# Patient Record
Sex: MALE | Race: WHITE | NOT HISPANIC OR LATINO | Employment: FULL TIME | URBAN - METROPOLITAN AREA
[De-identification: names, ages, dates, MRNs, and addresses within clinical notes are randomized per-mention and may not be internally consistent; named-entity substitution may affect disease eponyms.]

---

## 2017-07-27 ENCOUNTER — TRANSFERRED RECORDS (OUTPATIENT)
Dept: FAMILY MEDICINE | Facility: CLINIC | Age: 62
End: 2017-07-27

## 2017-09-22 DIAGNOSIS — J30.1 ACUTE SEASONAL ALLERGIC RHINITIS DUE TO POLLEN: ICD-10-CM

## 2017-09-22 RX ORDER — MONTELUKAST SODIUM 10 MG/1
1 TABLET ORAL DAILY
Qty: 30 TABLET | Refills: 0 | COMMUNITY
Start: 2017-09-22 | End: 2017-11-08

## 2017-09-22 RX ORDER — MONTELUKAST SODIUM 10 MG/1
TABLET ORAL
Qty: 90 TABLET | Refills: 0 | OUTPATIENT
Start: 2017-09-22

## 2017-09-22 NOTE — TELEPHONE ENCOUNTER
Called in 1 month of Singulair to [facility]. Pt needs OV for yearly medication review.    Thanks,Fadia  [phone] (home)

## 2017-10-01 DIAGNOSIS — J30.1 ACUTE SEASONAL ALLERGIC RHINITIS DUE TO POLLEN: ICD-10-CM

## 2017-10-01 DIAGNOSIS — J30.1 ALLERGIC RHINITIS DUE TO POLLEN: ICD-10-CM

## 2017-10-02 RX ORDER — OLOPATADINE HYDROCHLORIDE 1 MG/ML
1 SOLUTION/ DROPS OPHTHALMIC 2 TIMES DAILY
Qty: 5 ML | Refills: 0 | COMMUNITY
Start: 2017-10-02 | End: 2017-11-08

## 2017-10-02 RX ORDER — OLOPATADINE HYDROCHLORIDE 1 MG/ML
SOLUTION/ DROPS OPHTHALMIC
Qty: 5 ML | Refills: 0 | OUTPATIENT
Start: 2017-10-02

## 2017-11-08 ENCOUNTER — OFFICE VISIT (OUTPATIENT)
Dept: FAMILY MEDICINE | Facility: CLINIC | Age: 62
End: 2017-11-08

## 2017-11-08 VITALS
BODY MASS INDEX: 24.96 KG/M2 | HEIGHT: 67 IN | WEIGHT: 159 LBS | OXYGEN SATURATION: 98 % | TEMPERATURE: 98.2 F | SYSTOLIC BLOOD PRESSURE: 108 MMHG | DIASTOLIC BLOOD PRESSURE: 72 MMHG | HEART RATE: 82 BPM

## 2017-11-08 DIAGNOSIS — M79.642 PAIN OF LEFT HAND: ICD-10-CM

## 2017-11-08 DIAGNOSIS — Z11.59 NEED FOR HEPATITIS C SCREENING TEST: ICD-10-CM

## 2017-11-08 DIAGNOSIS — M77.41 METATARSALGIA OF BOTH FEET: ICD-10-CM

## 2017-11-08 DIAGNOSIS — N52.9 ERECTILE DYSFUNCTION, UNSPECIFIED ERECTILE DYSFUNCTION TYPE: ICD-10-CM

## 2017-11-08 DIAGNOSIS — J30.1 ACUTE SEASONAL ALLERGIC RHINITIS DUE TO POLLEN: Primary | ICD-10-CM

## 2017-11-08 DIAGNOSIS — M77.42 METATARSALGIA OF BOTH FEET: ICD-10-CM

## 2017-11-08 DIAGNOSIS — Z23 NEED FOR VACCINATION: ICD-10-CM

## 2017-11-08 DIAGNOSIS — Z80.42 FAMILY HISTORY OF MALIGNANT NEOPLASM OF PROSTATE: ICD-10-CM

## 2017-11-08 PROCEDURE — 84153 ASSAY OF PSA TOTAL: CPT | Mod: 90 | Performed by: FAMILY MEDICINE

## 2017-11-08 PROCEDURE — 36415 COLL VENOUS BLD VENIPUNCTURE: CPT | Performed by: FAMILY MEDICINE

## 2017-11-08 PROCEDURE — 99214 OFFICE O/P EST MOD 30 MIN: CPT | Mod: 25 | Performed by: FAMILY MEDICINE

## 2017-11-08 PROCEDURE — 90686 IIV4 VACC NO PRSV 0.5 ML IM: CPT | Performed by: FAMILY MEDICINE

## 2017-11-08 PROCEDURE — 90471 IMMUNIZATION ADMIN: CPT | Performed by: FAMILY MEDICINE

## 2017-11-08 PROCEDURE — 86803 HEPATITIS C AB TEST: CPT | Mod: 90 | Performed by: FAMILY MEDICINE

## 2017-11-08 PROCEDURE — 80048 BASIC METABOLIC PNL TOTAL CA: CPT | Mod: 90 | Performed by: FAMILY MEDICINE

## 2017-11-08 RX ORDER — SILDENAFIL 100 MG/1
100 TABLET, FILM COATED ORAL DAILY PRN
Qty: 6 TABLET | Refills: 1 | COMMUNITY
Start: 2017-11-08 | End: 2018-02-05

## 2017-11-08 RX ORDER — OLOPATADINE HYDROCHLORIDE 1 MG/ML
1 SOLUTION/ DROPS OPHTHALMIC 2 TIMES DAILY
Qty: 15 ML | Refills: 3 | Status: SHIPPED | OUTPATIENT
Start: 2017-11-08 | End: 2018-09-26

## 2017-11-08 RX ORDER — MONTELUKAST SODIUM 10 MG/1
1 TABLET ORAL DAILY
Qty: 90 TABLET | Refills: 3 | Status: SHIPPED | OUTPATIENT
Start: 2017-11-08 | End: 2018-09-26

## 2017-11-08 RX ORDER — CELECOXIB 200 MG/1
200 CAPSULE ORAL DAILY
Qty: 90 CAPSULE | Refills: 3 | Status: SHIPPED | OUTPATIENT
Start: 2017-11-08 | End: 2018-09-26

## 2017-11-08 NOTE — MR AVS SNAPSHOT
After Visit Summary   11/8/2017    Jose F Cortez    MRN: [id number]           Patient Information     Date Of Birth          1955        Visit Information        Provider Department      11/8/2017 3:15 PM Diego Niño MD [facility]        Today's Diagnoses     Acute seasonal allergic rhinitis due to pollen    -  1    Metatarsalgia of both feet        Need for vaccination        Need for hepatitis C screening test        Family history of malignant neoplasm of prostate        Erectile dysfunction, unspecified erectile dysfunction type        Pain of left hand           Follow-ups after your visit        Additional Services     ORTHOPEDICS ADULT REFERRAL       Your provider has referred you to: [facility] [place] [phone]   [email or web address]    Please be aware that coverage of these services is subject to the terms and limitations of your health insurance plan.  Call member services at your health plan with any benefit or coverage questions.      Please bring the following to your appointment:    >>   Any x-rays, CTs or MRIs which have been performed.  Contact the facility where they were done to arrange for  prior to your scheduled appointment.    >>   List of current medications   >>   This referral request   >>   Any documents/labs given to you for this referral                  Follow-up notes from your care team     Return in about 1 year (around 11/8/2018).      Who to contact     If you have questions or need follow up information about today's clinic visit or your schedule please contact [facility] directly at [phone].  Normal or non-critical lab and imaging results will be communicated to you by MyChart, letter or phone within 4 business days after the clinic has received the results. If you do not hear from us within 7 days, please contact the clinic through [employer]  "or phone. If you have a critical or abnormal lab result, we will notify you by phone as soon as possible.  Submit refill requests through [employer] or call your pharmacy and they will forward the refill request to us. Please allow 3 business days for your refill to be completed.          Additional Information About Your Visit        [facility] Information     [employer] gives you secure access to your electronic health record. If you see a primary care provider, you can also send messages to your care team and make appointments. If you have questions, please call your primary care clinic.  If you do not have a primary care provider, please call [phone] and they will assist you.        Care EveryWhere ID     This is your Care EveryWhere ID. This could be used by other organizations to access your [facility] medical records  [id number]        Your Vitals Were     Pulse Temperature Height Pulse Oximetry BMI (Body Mass Index)       82 98.2  F (36.8  C) (Oral) 1.708 m (5' 7.25\") 98% 24.72 kg/m2        Blood Pressure from Last 3 Encounters:   11/08/17 108/72   08/23/16 108/68   11/30/15 120/80    Weight from Last 3 Encounters:   11/08/17 72.1 kg (159 lb)   08/23/16 69.7 kg (153 lb 9.6 oz)   11/30/15 71.7 kg (158 lb)              We Performed the Following     BASIC METABOLIC PANEL ([facility])     HC FLU VAC PRESRV FREE QUAD SPLIT VIR 3+YRS IM     HCL PSA, SCREENING ([facility])     Hepatits C antibody ([facility])     ORTHOPEDICS ADULT REFERRAL     VACCINE ADMINISTRATION, INITIAL     VENOUS COLLECTION          Today's Medication Changes          These changes are accurate as of: 11/8/17  4:12 PM.  If you have any questions, ask your nurse or doctor.               These medicines have changed or have updated prescriptions.        Dose/Directions    * sildenafil 100 MG tablet   Commonly known as:  VIAGRA   This may have changed:  Another medication with the same name was added. Make sure you understand how and when to take each.   Used " for:  Erectile dysfunction, unspecified erectile dysfunction type   Changed by:  Diego Niño MD        Dose:   mg   Take 0.5-1 tablets ( mg) by mouth daily as needed for erectile dysfunction Take 30 min to 4 hours before intercourse.  Never use with nitroglycerin, terazosin or doxazosin.   Quantity:  4 tablet   Refills:  0       * sildenafil 100 MG tablet   Commonly known as:  VIAGRA   This may have changed:  You were already taking a medication with the same name, and this prescription was added. Make sure you understand how and when to take each.   Used for:  Erectile dysfunction, unspecified erectile dysfunction type   Changed by:  Diego Niño MD        Dose:  100 mg   Take 1 tablet (100 mg) by mouth daily as needed 30 min to 4 hrs before sex. Do not use with nitroglycerin, terazosin or doxazosin.   Quantity:  6 tablet   Refills:  1       * Notice:  This list has 2 medication(s) that are the same as other medications prescribed for you. Read the directions carefully, and ask your doctor or other care provider to review them with you.         Where to get your medicines      These medications were sent to [facility] [address] [place] [id number] [facility] AT [place] [address] & [address] [address]     Phone:  [phone]     celecoxib 200 MG capsule    montelukast 10 MG tablet    olopatadine 0.1 % ophthalmic solution         Some of these will need a paper prescription and others can be bought over the counter.  Ask your nurse if you have questions.     You don't need a prescription for these medications     sildenafil 100 MG tablet                Primary Care Provider Office Phone # Fax #    Diego Niño [phone] [address] [address]        Equal Access to Services     CANDIDA FRANKLIN AH: clare Akhtar qaybta kaalmada adeegyada,  caitlyn ramirez kang sanchez'aan ah. So [facility] [phone].    ATENCIÓN: Si isidoro toure, tiene a andrea disposición servicios gratuitos de asistencia lingüística. Dee ghosh [phone].    We comply with applicable federal civil rights laws and [place] laws. We do not discriminate on the basis of race, color, national origin, age, disability, sex, sexual orientation, or gender identity.            Thank you!     Thank you for choosing [facility]  for your care. Our goal is always to provide you with excellent care. Hearing back from our patients is one way we can continue to improve our services. Please take a few minutes to complete the written survey that you may receive in the mail after your visit with us. Thank you!             Your Updated Medication List - Protect others around you: Learn how to safely use, store and throw away your medicines at www.disposemymeds.org.          This list is accurate as of: 11/8/17  4:12 PM.  Always use your most recent med list.                   Brand Name Dispense Instructions for use Diagnosis    aspirin EC 81 MG EC tablet      Take 81 mg by mouth daily.        celecoxib 200 MG capsule    celeBREX    90 capsule    Take 1 capsule (200 mg) by mouth daily    Metatarsalgia of both feet       CLARITIN 10 MG tablet   Generic drug:  loratadine      Take 10 mg by mouth daily.    Allergic rhinitis, cause unspecified       mometasone 0.1 % cream    ELOCON    45 g    Apply topically 2 times daily as needed Apply sparingly to affected area.  Do not apply to face.    Dermatitis       montelukast 10 MG tablet    SINGULAIR    90 tablet    Take 1 tablet (10 mg) by mouth daily    Acute seasonal allergic rhinitis due to pollen       olopatadine 0.1 % ophthalmic solution    PATANOL    15 mL    Place 1 drop into both eyes 2 times daily    Acute seasonal allergic rhinitis due to pollen       * sildenafil 100 MG tablet    VIAGRA    4 tablet    Take 0.5-1 tablets  ( mg) by mouth daily as needed for erectile dysfunction Take 30 min to 4 hours before intercourse.  Never use with nitroglycerin, terazosin or doxazosin.    Erectile dysfunction, unspecified erectile dysfunction type       * sildenafil 100 MG tablet    VIAGRA    6 tablet    Take 1 tablet (100 mg) by mouth daily as needed 30 min to 4 hrs before sex. Do not use with nitroglycerin, terazosin or doxazosin.    Erectile dysfunction, unspecified erectile dysfunction type       * Notice:  This list has 2 medication(s) that are the same as other medications prescribed for you. Read the directions carefully, and ask your doctor or other care provider to review them with you.

## 2017-11-08 NOTE — NURSING NOTE
Jose F TING MichelleDiego is here today for a non fasting medication recheck.    Pre-Visit Screening :  Immunizations : up to date, Flu Shot Today  Colon Screening : is up to date  Asthma Action Test/Plan : CALLY  PHQ9/GAD7 :  NA    Pulse - regular  My Chart - accepts    CLASSIFICATION OF OVERWEIGHT AND OBESITY BY BMI                         Obesity Class           BMI(kg/m2)  Underweight                                    < 18.5  Normal                                         18.5-24.9  Overweight                                     25.0-29.9  OBESITY                     I                  30.0-34.9                              II                 35.0-39.9  EXTREME OBESITY             III                >40                             Patient's  BMI Body mass index is 24.72 kg/(m^2).  http://hin.nhlbi.nih.gov/menuplanner/menu.cgi  Questioned patient about current smoking habits.  Pt. has never smoked.    Maya Alvares, CMA

## 2017-11-08 NOTE — PROGRESS NOTES
SUBJECTIVE:  Jose F Cortez, a 62 year old male scheduled an appointment to discuss the following issues:     Acute seasonal allergic rhinitis due to pollen  Metatarsalgia of both feet  Need for vaccination  Need for hepatitis C screening test  Family history of malignant neoplasm of prostate  Erectile dysfunction, unspecified erectile dysfunction type  Pain of left hand  Pt using Singulair year round- feels it has controlled symptoms well.     His feet continue to cause a lot of pain-saw Dr Bryson again at [facility] and has new orthotics    Pt states viagra works well, no med side effects     Pt relates 4 months pain in left dorsal hand- comes in shooting waves, no numbness or weakness.  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  Patient Active Problem List   Diagnosis     Allergic rhinitis     Actinic keratosis     Viral warts     Health Care Home     ACP (advance care planning)     Family history of malignant neoplasm of prostate     Metatarsalgia of both feet     Past Medical History:   Diagnosis Date     Allergic rhinitis, cause unspecified     testing last year-trees     Fam hx-cardiovas dis NEC      Family History   Problem Relation Age of Onset     Alzheimer Disease Mother      dementia/ 72     DIABETES Maternal Grandmother      CANCER No family hx of      HEART DISEASE Father      Breast Cancer No family hx of      Cancer - colorectal No family hx of      Prostate Cancer Brother      Social History     Social History     Marital status:      Spouse name: Peng     Number of children: 1     Years of education: 14     Occupational History      [employer]     Social History Main Topics     Smoking status: Never Smoker     Smokeless tobacco: Never Used     Alcohol use 6.0 oz/week     12 drink(s) per week      Comment: 2 beers daily     Drug use: No     Sexual activity: Yes     Partners: Female     Birth control/  protection: Surgical      Comment: tubal ligation     Other Topics Concern      Service No     Blood Transfusions No     Caffeine Concern No     Occupational Exposure No     Hobby Hazards No     Sleep Concern No     Stress Concern No     Weight Concern No     Special Diet No     Exercise Yes     Seat Belt Yes     Social History Narrative     Past Surgical History:   Procedure Laterality Date     HC COLONOSCOPY THRU STOMA, DIAGNOSTIC  3/2008    Normal / Dr Trent       Current Outpatient Prescriptions on File Prior to Visit:  aspirin EC 81 MG tablet Take 81 mg by mouth daily.   [DISCONTINUED] montelukast (SINGULAIR) 10 MG tablet Take 1 tablet (10 mg) by mouth daily   sildenafil (VIAGRA) 100 MG tablet Take 0.5-1 tablets ( mg) by mouth daily as needed for erectile dysfunction Take 30 min to 4 hours before intercourse.  Never use with nitroglycerin, terazosin or doxazosin.   [DISCONTINUED] celecoxib (CELEBREX) 200 MG capsule Take 1 capsule (200 mg) by mouth daily   mometasone (ELOCON) 0.1 % cream Apply topically 2 times daily as needed Apply sparingly to affected area.  Do not apply to face.   loratadine (CLARITIN) 10 MG tablet Take 10 mg by mouth daily.     No current facility-administered medications on file prior to visit.      Allergies: Tetanus toxoid    Immunization History   Administered Date(s) Administered     Influenza Vaccine IM 3yrs+ 4 Valent IIV4 11/06/2013, 12/01/2014, 10/26/2015, 11/09/2016     Pneumococcal (PCV 13) 12/01/2014     TD (ADULT, 7+) 01/01/1992     Zoster vaccine, live 12/06/2014      ROS:  C: NEGATIVE for fever, chills  INTEGUMENTARY/SKIN: some crusted lesions on scalp  E: NEGATIVE for vision changes   R: NEGATIVE for significant cough or SOB  CV: NEGATIVE for chest pain, palpitations   GI: NEGATIVE for nausea, abdominal pain, heartburn, or change in bowel habits  : NEGATIVE for frequency, dysuria, or hematuria  N: NEGATIVE for weakness, dizziness or paresthesias or headache  E:  "NEGATIVE for temperature intolerance, skin/hair changes    OBJECTIVE:  /72 (BP Location: Left arm, Patient Position: Chair, Cuff Size: Adult Large)  Pulse 82  Temp 98.2  F (36.8  C) (Oral)  Ht 1.708 m (5' 7.25\")  Wt 72.1 kg (159 lb)  SpO2 98%  BMI 24.72 kg/m2  EXAM:  GENERAL APPEARANCE: healthy, alert and no distress  EYES: EOMI,  PERRL  HENT: ear canals and TM's normal and nose and mouth without ulcers or lesions  NECK: no adenopathy, no asymmetry, masses, or scars and thyroid normal to palpation  RESP: lungs clear to auscultation - no rales, rhonchi or wheezes  CV: regular rates and rhythm, normal S1 S2, no S3 or S4 and no murmur, click or rub -  ABDOMEN:  soft, nontender, no HSM or masses and bowel sounds normal  MS: left hand with larger appearance than right, some tenderness dorsally between MCP joints  SKIN: keratoses - actinic-scalp  NEURO: Normal strength and tone, sensory exam grossly normal, mentation intact and speech normal  PSYCH: mentation appears normal and affect normal/bright    ASSESSMENT/PLAN:  (J30.1) Acute seasonal allergic rhinitis due to pollen  (primary encounter diagnosis)  Comment: well controlled  Plan: olopatadine (PATANOL) 0.1 % ophthalmic         solution, montelukast (SINGULAIR) 10 MG tablet,        VENOUS COLLECTION        continue current medications at current doses     (M77.41,  M77.42) Metatarsalgia of both feet  Comment: stable  Plan: celecoxib (CELEBREX) 200 MG capsule, BASIC         METABOLIC PANEL ([facility]), VENOUS COLLECTION        continue current medications at current doses     (Z23) Need for vaccination  Comment:   Plan: VACCINE ADMINISTRATION, INITIAL, HC FLU VAC         PRESRV FREE QUAD SPLIT VIR 3+YRS IM            (Z11.59) Need for hepatitis C screening test  Comment:   Plan: Hepatits C antibody ([facility]), VENOUS COLLECTION            (Z80.42) Family history of malignant neoplasm of prostate  Comment:   Plan: HCL PSA, SCREENING ([facility]), VENOUS COLLECTION       "      (N52.9) Erectile dysfunction, unspecified erectile dysfunction type  Comment: well controlled  Plan: sildenafil (VIAGRA) 100 MG tablet        continue current medications at current doses     (M79.642) Pain of left hand  Comment: unclear if arthritic or nerve issue- given length of symptoms recommend arnaldo mercedes  Plan: ORTHOPEDICS ADULT REFERRAL

## 2017-11-09 LAB
ABBOTT PSA - QUEST: 0.8 NG/ML
BUN SERPL-MCNC: 18 MG/DL (ref 7–25)
BUN/CREATININE RATIO: NORMAL (CALC) (ref 6–22)
CALCIUM SERPL-MCNC: 9.8 MG/DL (ref 8.6–10.3)
CHLORIDE SERPLBLD-SCNC: 103 MMOL/L (ref 98–110)
CO2 SERPL-SCNC: 22 MMOL/L (ref 20–31)
CREAT SERPL-MCNC: 1.06 MG/DL (ref 0.7–1.25)
EGFR AFRICAN AMERICAN - QUEST: 86 ML/MIN/1.73M2
GFR SERPL CREATININE-BSD FRML MDRD: 74 ML/MIN/1.73M2
GLUCOSE - QUEST: 83 MG/DL (ref 65–99)
HCV AB - QUEST: NORMAL
POTASSIUM SERPL-SCNC: 4.6 MMOL/L (ref 3.5–5.3)
SIGNAL TO CUT OFF - QUEST: 0.01
SODIUM SERPL-SCNC: 140 MMOL/L (ref 135–146)

## 2018-01-08 ENCOUNTER — MYC MEDICAL ADVICE (OUTPATIENT)
Dept: FAMILY MEDICINE | Facility: CLINIC | Age: 63
End: 2018-01-08

## 2018-01-08 DIAGNOSIS — N52.9 ERECTILE DYSFUNCTION, UNSPECIFIED ERECTILE DYSFUNCTION TYPE: Primary | ICD-10-CM

## 2018-01-08 RX ORDER — SILDENAFIL CITRATE 20 MG/1
TABLET ORAL
Qty: 30 TABLET | Refills: 1 | Status: SHIPPED | OUTPATIENT
Start: 2018-01-08 | End: 2018-01-29

## 2018-01-08 NOTE — TELEPHONE ENCOUNTER
From: Jose F Cortez  To: Diego Niño MD  Sent: 1/8/2018 9:17 AM CST  Subject: Question about medications    Good morning Dr. Niño. I had a little success with the Viagra. Would you please write a prescription for the generic Viagra and I'll give that a try. Have a great week. Thank you.

## 2018-01-22 ENCOUNTER — TELEPHONE (OUTPATIENT)
Dept: FAMILY MEDICINE | Facility: CLINIC | Age: 63
End: 2018-01-22

## 2018-01-22 NOTE — TELEPHONE ENCOUNTER
PA for Sildenafil 20mg tabs has been started on Covermymeds.com.    Key: [id number]    Maya Alvares CMA

## 2018-01-23 ENCOUNTER — MYC MEDICAL ADVICE (OUTPATIENT)
Dept: FAMILY MEDICINE | Facility: CLINIC | Age: 63
End: 2018-01-23

## 2018-01-23 NOTE — TELEPHONE ENCOUNTER
Can you let him know they are not covering any longer- he can talk to me if he wants to pursue other options

## 2018-01-29 ENCOUNTER — MYC MEDICAL ADVICE (OUTPATIENT)
Dept: FAMILY MEDICINE | Facility: CLINIC | Age: 63
End: 2018-01-29

## 2018-01-29 DIAGNOSIS — N52.9 ERECTILE DYSFUNCTION, UNSPECIFIED ERECTILE DYSFUNCTION TYPE: ICD-10-CM

## 2018-01-29 RX ORDER — SILDENAFIL CITRATE 20 MG/1
TABLET ORAL
Qty: 30 TABLET | Refills: 1 | Status: SHIPPED | OUTPATIENT
Start: 2018-01-29 | End: 2018-01-29

## 2018-01-29 RX ORDER — SILDENAFIL CITRATE 20 MG/1
TABLET ORAL
Qty: 30 TABLET | Refills: 1 | Status: SHIPPED | OUTPATIENT
Start: 2018-01-29 | End: 2018-02-05

## 2018-01-29 NOTE — TELEPHONE ENCOUNTER
From: Jose F Cortez  To: Diego Niño MD  Sent: 1/29/2018 11:35 AM CST  Subject: Question about medications    Hi Dr. Niño. I called on my notice of denial for the Sildenafil. They said the quantity per day can only be 3 not 5. If you could re write the prescription to 3 and fax to The Appeals Department @ [facility] that would be great. [phone]. In the meantime, if you have any more samples I could  would be appreciated. Thank you. Jose F.

## 2018-01-29 NOTE — TELEPHONE ENCOUNTER
I am not faxing, you just need to send the Rx to [facility] electronically.  I have changed the quantity on a previous note.  He has sent two separate My Chrt notes today.

## 2018-01-29 NOTE — TELEPHONE ENCOUNTER
From: Jose F Cortez  To: Diego Niño MD  Sent: 1/29/2018 12:38 PM CST  Subject: Question about medications    Hi Dr Niño.    I received a notice of denial from [facility] for the Sildenafil Citrate. I called and they said the quantity per day covered is 3. If you could re write the prescription from 5 per day to 3 per day, I think they will cover it. Please fax to [facility] appeals department: [phone]. My case number is [id number]. In the meantime, if you have any more samples I could  that would be great. Thank you.    Jose F.

## 2018-01-30 NOTE — TELEPHONE ENCOUNTER
Recommend pt consider paying cash for 20 mg sildenafil -it is $65 for #90 at [facility] and is likely siliar elsewhere-he may need to call around-make sure to say cash pay    I can give rx for whet he wants

## 2018-01-30 NOTE — TELEPHONE ENCOUNTER
PA was once again denied, even for up to 3 a day.  Pt does not have Pulmonary hypertension.     Also sent Pt my chart. You can close once noted

## 2018-02-05 ENCOUNTER — MYC MEDICAL ADVICE (OUTPATIENT)
Dept: FAMILY MEDICINE | Facility: CLINIC | Age: 63
End: 2018-02-05

## 2018-02-05 DIAGNOSIS — N52.9 ERECTILE DYSFUNCTION, UNSPECIFIED ERECTILE DYSFUNCTION TYPE: ICD-10-CM

## 2018-02-05 NOTE — TELEPHONE ENCOUNTER
Jose F Cortez is requesting a refill of:    Pending Prescriptions:                       Disp   Refills    sildenafil (REVATIO) 20 MG tablet         30 tab*1            Sig: Take 1-3 tablets prn for erectile difficulties.            Never use with nitroglycerin, terazosin or           doxazosin.

## 2018-02-05 NOTE — TELEPHONE ENCOUNTER
From: Jose F Cortez  To: Diego Niño MD  Sent: 2/5/2018 12:46 PM CST  Subject: Question about medications    Fadia UMANA   Thank you for the information. Please send prescription to [facility]. Thanks for all of your help. Jose F Cortez.

## 2018-02-06 RX ORDER — SILDENAFIL CITRATE 20 MG/1
TABLET ORAL
Qty: 30 TABLET | Refills: 1 | Status: SHIPPED | OUTPATIENT
Start: 2018-02-06 | End: 2018-03-13

## 2018-03-12 ENCOUNTER — MYC MEDICAL ADVICE (OUTPATIENT)
Dept: FAMILY MEDICINE | Facility: CLINIC | Age: 63
End: 2018-03-12

## 2018-03-12 DIAGNOSIS — L30.9 DERMATITIS: ICD-10-CM

## 2018-03-13 DIAGNOSIS — N52.9 ERECTILE DYSFUNCTION, UNSPECIFIED ERECTILE DYSFUNCTION TYPE: ICD-10-CM

## 2018-03-13 RX ORDER — MOMETASONE FUROATE 1 MG/G
CREAM TOPICAL 2 TIMES DAILY PRN
Qty: 45 G | Refills: 0 | Status: SHIPPED | OUTPATIENT
Start: 2018-03-13 | End: 2018-09-26

## 2018-03-13 RX ORDER — SILDENAFIL CITRATE 20 MG/1
TABLET ORAL
Qty: 30 TABLET | Refills: 1 | Status: SHIPPED | OUTPATIENT
Start: 2018-03-13 | End: 2019-10-23

## 2018-03-13 NOTE — TELEPHONE ENCOUNTER
From: Jose F Cortez  To: Diego Niño MD  Sent: 3/12/2018 10:45 PM CDT  Subject: Question about medications    Hi Dr. Niño. Would you please refill an old prescription for Mometasone 0.1% Cream 45GM prescription number [id number] at [facility] in [place] & Sildenafil 20 MG prescription number [id number] at [facility] in [place]? Thank you. Del

## 2018-03-13 NOTE — TELEPHONE ENCOUNTER
Jose F Cortez is requesting a refill of:    Pending Prescriptions:                       Disp   Refills    mometasone (ELOCON) 0.1 % cream           45 g   0            Sig: Apply topically 2 times daily as needed Apply           sparingly to affected area.  Do not apply to           face.    Please close encounter if RX was sent. Thanks, Fadia

## 2018-03-13 NOTE — TELEPHONE ENCOUNTER
Jose F Cortez is requesting a refill of:    Pending Prescriptions:                       Disp   Refills    sildenafil (REVATIO) 20 MG tablet         30 tab*1            Sig: Take 1-3 tablets prn for erectile difficulties.            Never use with nitroglycerin, terazosin or           doxazosin.    Please close encounter if RX was sent. Thanks, Fadia

## 2018-09-26 ENCOUNTER — OFFICE VISIT (OUTPATIENT)
Dept: FAMILY MEDICINE | Facility: CLINIC | Age: 63
End: 2018-09-26

## 2018-09-26 VITALS
DIASTOLIC BLOOD PRESSURE: 78 MMHG | TEMPERATURE: 98.8 F | SYSTOLIC BLOOD PRESSURE: 120 MMHG | WEIGHT: 160.8 LBS | BODY MASS INDEX: 25 KG/M2 | HEART RATE: 54 BPM | OXYGEN SATURATION: 94 %

## 2018-09-26 DIAGNOSIS — J30.1 ACUTE SEASONAL ALLERGIC RHINITIS DUE TO POLLEN: ICD-10-CM

## 2018-09-26 DIAGNOSIS — N52.9 ERECTILE DYSFUNCTION, UNSPECIFIED ERECTILE DYSFUNCTION TYPE: ICD-10-CM

## 2018-09-26 DIAGNOSIS — L30.9 DERMATITIS: ICD-10-CM

## 2018-09-26 DIAGNOSIS — M77.42 METATARSALGIA OF BOTH FEET: Primary | ICD-10-CM

## 2018-09-26 DIAGNOSIS — M77.41 METATARSALGIA OF BOTH FEET: Primary | ICD-10-CM

## 2018-09-26 DIAGNOSIS — Z12.11 SPECIAL SCREENING FOR MALIGNANT NEOPLASMS, COLON: ICD-10-CM

## 2018-09-26 DIAGNOSIS — Z23 NEED FOR VACCINATION: ICD-10-CM

## 2018-09-26 DIAGNOSIS — Z80.42 FAMILY HISTORY OF MALIGNANT NEOPLASM OF PROSTATE: ICD-10-CM

## 2018-09-26 PROCEDURE — 90471 IMMUNIZATION ADMIN: CPT | Performed by: FAMILY MEDICINE

## 2018-09-26 PROCEDURE — 80053 COMPREHEN METABOLIC PANEL: CPT | Mod: 90 | Performed by: FAMILY MEDICINE

## 2018-09-26 PROCEDURE — 90732 PPSV23 VACC 2 YRS+ SUBQ/IM: CPT | Performed by: FAMILY MEDICINE

## 2018-09-26 PROCEDURE — 90472 IMMUNIZATION ADMIN EACH ADD: CPT | Performed by: FAMILY MEDICINE

## 2018-09-26 PROCEDURE — 84153 ASSAY OF PSA TOTAL: CPT | Mod: 90 | Performed by: FAMILY MEDICINE

## 2018-09-26 PROCEDURE — 90686 IIV4 VACC NO PRSV 0.5 ML IM: CPT | Performed by: FAMILY MEDICINE

## 2018-09-26 PROCEDURE — 99214 OFFICE O/P EST MOD 30 MIN: CPT | Mod: 25 | Performed by: FAMILY MEDICINE

## 2018-09-26 PROCEDURE — 36415 COLL VENOUS BLD VENIPUNCTURE: CPT | Performed by: FAMILY MEDICINE

## 2018-09-26 RX ORDER — CELECOXIB 200 MG/1
200 CAPSULE ORAL DAILY
Qty: 90 CAPSULE | Refills: 3 | Status: SHIPPED | OUTPATIENT
Start: 2018-09-26 | End: 2019-10-23

## 2018-09-26 RX ORDER — OLOPATADINE HYDROCHLORIDE 1 MG/ML
1 SOLUTION/ DROPS OPHTHALMIC 2 TIMES DAILY
Qty: 15 ML | Refills: 3 | Status: SHIPPED | OUTPATIENT
Start: 2018-09-26 | End: 2019-10-23

## 2018-09-26 RX ORDER — SILDENAFIL 100 MG/1
100 TABLET, FILM COATED ORAL DAILY PRN
Qty: 20 TABLET | Refills: 1 | Status: SHIPPED | OUTPATIENT
Start: 2018-09-26 | End: 2019-06-18

## 2018-09-26 RX ORDER — SILDENAFIL CITRATE 20 MG/1
TABLET ORAL
Qty: 30 TABLET | Refills: 1 | Status: CANCELLED | OUTPATIENT
Start: 2018-09-26

## 2018-09-26 RX ORDER — MONTELUKAST SODIUM 10 MG/1
1 TABLET ORAL DAILY
Qty: 90 TABLET | Refills: 3 | Status: SHIPPED | OUTPATIENT
Start: 2018-09-26 | End: 2019-10-23

## 2018-09-26 RX ORDER — MOMETASONE FUROATE 1 MG/G
CREAM TOPICAL 2 TIMES DAILY PRN
Qty: 45 G | Refills: 0 | Status: SHIPPED | OUTPATIENT
Start: 2018-09-26 | End: 2022-06-12

## 2018-09-26 NOTE — PROGRESS NOTES
SUBJECTIVE:  Jose F Cortez, a 63 year old male scheduled an appointment to discuss the following issues:     Metatarsalgia of both feet  Dermatitis  Acute seasonal allergic rhinitis due to pollen  Erectile dysfunction, unspecified erectile dysfunction type  Need for vaccination  Here for refills, no new issues, no med side effects     Medical, social, surgical, and family histories reviewed.    Pt exercising    Patient Active Problem List   Diagnosis     Allergic rhinitis     Actinic keratosis     Viral warts     Health Care Home     ACP (advance care planning)     Family history of malignant neoplasm of prostate     Metatarsalgia of both feet     Past Medical History:   Diagnosis Date     Allergic rhinitis, cause unspecified     testing last year-trees     Fam hx-cardiovas dis NEC      Family History   Problem Relation Age of Onset     Alzheimer Disease Mother      dementia/ 72     Diabetes Maternal Grandmother      Cancer No family hx of      HEART DISEASE Father      Breast Cancer No family hx of      Cancer - colorectal No family hx of      Prostate Cancer Brother      Social History     Social History     Marital status:      Spouse name: Peng     Number of children: 1     Years of education: 14     Occupational History      [employer] [place]     Social History Main Topics     Smoking status: Never Smoker     Smokeless tobacco: Never Used     Alcohol use 6.0 oz/week     12 drink(s) per week      Comment: 2 beers daily     Drug use: No     Sexual activity: Yes     Partners: Female     Birth control/ protection: Surgical      Comment: tubal ligation     Other Topics Concern      Service No     Blood Transfusions No     Caffeine Concern No     Occupational Exposure No     Hobby Hazards No     Sleep Concern No     Stress Concern No     Weight Concern No     Special Diet No     Exercise Yes     Seat Belt Yes     Social History Narrative     Past Surgical History:   Procedure  Laterality Date     HC COLONOSCOPY THRU STOMA, DIAGNOSTIC  3/2008    John / Dr Trent       Current Outpatient Prescriptions on File Prior to Visit:  aspirin EC 81 MG tablet Take 81 mg by mouth daily.   celecoxib (CELEBREX) 200 MG capsule Take 1 capsule (200 mg) by mouth daily   loratadine (CLARITIN) 10 MG tablet Take 10 mg by mouth daily.   mometasone (ELOCON) 0.1 % cream Apply topically 2 times daily as needed Apply sparingly to affected area.  Do not apply to face.   montelukast (SINGULAIR) 10 MG tablet Take 1 tablet (10 mg) by mouth daily   olopatadine (PATANOL) 0.1 % ophthalmic solution Place 1 drop into both eyes 2 times daily   sildenafil (REVATIO) 20 MG tablet Take 1-3 tablets prn for erectile difficulties.  Never use with nitroglycerin, terazosin or doxazosin.     No current facility-administered medications on file prior to visit.      Allergies: Tetanus toxoid    Immunization History   Administered Date(s) Administered     Influenza Vaccine IM 3yrs+ 4 Valent IIV4 11/06/2013, 12/01/2014, 10/26/2015, 11/09/2016, 11/08/2017     Pneumo Conj 13-V (2010&after) 12/01/2014     TD (ADULT, 7+) 01/01/1992     Zoster vaccine, live 12/06/2014        ROS:  CONSTITUTIONAL: NEGATIVE for fever, chills  EYES: NEGATIVE for vision changes   RESP: NEGATIVE for significant cough or SOB  CV: NEGATIVE for chest pain, palpitations   GI: NEGATIVE for nausea, abdominal pain, heartburn, or change in bowel habits  : NEGATIVE for frequency, dysuria, or hematuria  MUSCULOSKELETAL:left knee pain at times when kneeling, no swelling or locking  NEURO: NEGATIVE for weakness, dizziness or paresthesias or headache    OBJECTIVE:  /78 (BP Location: Left arm, Patient Position: Sitting, Cuff Size: Adult Large)  Pulse 54  Temp 98.8  F (37.1  C) (Oral)  Wt 72.9 kg (160 lb 12.8 oz)  SpO2 94%  BMI 25 kg/m2  EXAM:  GENERAL APPEARANCE: healthy, alert and no distress  EYES: EOMI,  PERRL  HENT: ear canals and TM's normal and nose and mouth  without ulcers or lesions  RESP: lungs clear to auscultation - no rales, rhonchi or wheezes  CV: regular rates and rhythm, normal S1 S2, no S3 or S4 and no murmur, click or rub -  ABDOMEN:  soft, nontender, no HSM or masses and bowel sounds normal    ASSESSMENT/PLAN:  (M77.41,  M77.42) Metatarsalgia of both feet  (primary encounter diagnosis)  Comment: stable  Plan: celecoxib (CELEBREX) 200 MG capsule        continue current medications at current doses     (L30.9) Dermatitis  Comment: stable  Plan: mometasone (ELOCON) 0.1 % cream        continue current medications at current doses     (J30.1) Acute seasonal allergic rhinitis due to pollen  Comment: stable  Plan: montelukast (SINGULAIR) 10 MG tablet,         olopatadine (PATANOL) 0.1 % ophthalmic solution        continue current medications at current doses     (N52.9) Erectile dysfunction, unspecified erectile dysfunction type  Comment: stable  Plan: continue current medications at current doses     (Z23) Need for vaccination  Comment:   Plan: HC FLU VAC PRESRV FREE QUAD SPLIT VIR 3+YRS IM,        VACCINE ADMINISTRATION, INITIAL

## 2018-09-26 NOTE — MR AVS SNAPSHOT
After Visit Summary   9/26/2018    Jose F Cortez    MRN: [id number]           Patient Information     Date Of Birth          1955        Visit Information        Provider Department      9/26/2018 4:15 PM Salinas, Diego Christopher, MD [facility]        Today's Diagnoses     Metatarsalgia of both feet    -  1    Dermatitis        Acute seasonal allergic rhinitis due to pollen        Erectile dysfunction, unspecified erectile dysfunction type        Need for vaccination        Special screening for malignant neoplasms, colon        Family history of malignant neoplasm of prostate           Follow-ups after your visit        Additional Services     GASTROENTEROLOGY ADULT REF PROCEDURE ONLY       Last Lab Result: Creatinine (mg/dL)       Date                     Value                 11/08/2017               1.06             ----------  Body mass index is 25 kg/(m^2).     Needed:  No  Language:  English    Patient will be contacted to schedule procedure.     Please be aware that coverage of these services is subject to the terms and limitations of your health insurance plan.  Call member services at your health plan with any benefit or coverage questions.  Any procedures must be performed at a [facility] facility OR coordinated by your clinic's referral office.    Please bring the following with you to your appointment:    (1) Any X-Rays, CTs or MRIs which have been performed.  Contact the facility where they were done to arrange for  prior to your scheduled appointment.    (2) List of current medications   (3) This referral request   (4) Any documents/labs given to you for this referral                  Follow-up notes from your care team     Return in about 1 year (around 9/26/2019).      Who to contact     If you have questions or need follow up information about today's clinic visit or your schedule please contact [facility] directly  at [phone].  Normal or non-critical lab and imaging results will be communicated to you by [email or web address], letter or phone within 4 business days after the clinic has received the results. If you do not hear from us within 7 days, please contact the clinic through MartManiat or phone. If you have a critical or abnormal lab result, we will notify you by phone as soon as possible.  Submit refill requests through SportsBeep or call your pharmacy and they will forward the refill request to us. Please allow 3 business days for your refill to be completed.          Additional Information About Your Visit        [email or web address] Information     SportsBeep gives you secure access to your electronic health record. If you see a primary care provider, you can also send messages to your care team and make appointments. If you have questions, please call your primary care clinic.  If you do not have a primary care provider, please call [phone] and they will assist you.        Care EveryWhere ID     This is your Care EveryWhere ID. This could be used by other organizations to access your [facility] medical records  [id number]        Your Vitals Were     Pulse Temperature Pulse Oximetry BMI (Body Mass Index)          54 98.8  F (37.1  C) (Oral) 94% 25 kg/m2         Blood Pressure from Last 3 Encounters:   09/26/18 120/78   11/08/17 108/72   08/23/16 108/68    Weight from Last 3 Encounters:   09/26/18 72.9 kg (160 lb 12.8 oz)   11/08/17 72.1 kg (159 lb)   08/23/16 69.7 kg (153 lb 9.6 oz)              We Performed the Following     COMPREHENSIVE METABOLIC PANEL ([facility]) [facility]     GASTROENTEROLOGY ADULT REF PROCEDURE ONLY     HC FLU VAC PRESRV FREE QUAD SPLIT VIR 3+YRS IM     HCL PSA, SCREENING ([facility])     Pneumococcal vaccine 23 valent PPSV23  (Pneumovax) [94853]     VACCINE ADMINISTRATION, EACH ADDITIONAL     VACCINE ADMINISTRATION, INITIAL     VENOUS COLLECTION          Today's Medication Changes          These changes are accurate as of 9/26/18   6:05 PM.  If you have any questions, ask your nurse or doctor.               Start taking these medicines.        Dose/Directions    sildenafil 100 MG tablet   Commonly known as:  VIAGRA   Used for:  Erectile dysfunction, unspecified erectile dysfunction type   Started by:  Diego Niño MD        Dose:  100 mg   Take 1 tablet (100 mg) by mouth daily as needed 30 min to 4 hrs before sex. Do not use with nitroglycerin, terazosin or doxazosin.   Quantity:  20 tablet   Refills:  1            Where to get your medicines      These medications were sent to [facility] [id number] [place] [address] [address] [place] [address] & [facility]  [address] [facility] [address]     Phone:  [phone]     celecoxib 200 MG capsule    mometasone 0.1 % cream    montelukast 10 MG tablet    olopatadine 0.1 % ophthalmic solution         Some of these will need a paper prescription and others can be bought over the counter.  Ask your nurse if you have questions.     Bring a paper prescription for each of these medications     sildenafil 100 MG tablet                Primary Care Provider Office Phone # Fax #    Diego Niño [phone] [address] [address]        Equal Access to Services     CANDIDA FRANKLIN AH: Hadii elliott ku hadasho Soomaali, waaxda luqadaha, qaybta kaalmada adeegyada, waxay idiin hayjackelynn july mandel. So [facility] [phone].    ATENCIÓN: Si habla español, tiene a andrea disposición servicios gratuitos de asistencia lingüística. Llame al [phone].    We comply with applicable federal civil rights laws and [place] laws. We do not discriminate on the basis of race, color, national origin, age, disability, sex, sexual orientation, or gender identity.            Thank you!     Thank you for choosing [facility]  for your care. Our goal is always to provide you with excellent care. Hearing back  from our patients is one way we can continue to improve our services. Please take a few minutes to complete the written survey that you may receive in the mail after your visit with us. Thank you!             Your Updated Medication List - Protect others around you: Learn how to safely use, store and throw away your medicines at www.disposemymeds.org.          This list is accurate as of 9/26/18  6:05 PM.  Always use your most recent med list.                   Brand Name Dispense Instructions for use Diagnosis    aspirin 81 MG EC tablet      Take 81 mg by mouth daily.        celecoxib 200 MG capsule    celeBREX    90 capsule    Take 1 capsule (200 mg) by mouth daily    Metatarsalgia of both feet       CLARITIN 10 MG tablet   Generic drug:  loratadine      Take 10 mg by mouth daily.    Allergic rhinitis, cause unspecified       mometasone 0.1 % cream    ELOCON    45 g    Apply topically 2 times daily as needed Apply sparingly to affected area.  Do not apply to face.    Dermatitis       montelukast 10 MG tablet    SINGULAIR    90 tablet    Take 1 tablet (10 mg) by mouth daily    Acute seasonal allergic rhinitis due to pollen       olopatadine 0.1 % ophthalmic solution    PATANOL    15 mL    Place 1 drop into both eyes 2 times daily    Acute seasonal allergic rhinitis due to pollen       sildenafil 100 MG tablet    VIAGRA    20 tablet    Take 1 tablet (100 mg) by mouth daily as needed 30 min to 4 hrs before sex. Do not use with nitroglycerin, terazosin or doxazosin.    Erectile dysfunction, unspecified erectile dysfunction type       sildenafil 20 MG tablet    REVATIO    30 tablet    Take 1-3 tablets prn for erectile difficulties.  Never use with nitroglycerin, terazosin or doxazosin.    Erectile dysfunction, unspecified erectile dysfunction type

## 2018-09-26 NOTE — NURSING NOTE
Jose F is here for med refill, update shots        Pre-visit Screening:  Immunizations:  not up to date - needs 2nd pneumonia  Colonoscopy:  is not up to date, will schedule  Mammogram: NA  Asthma Action Test/Plan:  NA  PHQ9:  none  GAD7:  none  Questioned patient about current smoking habits Pt. has never smoked.  Ok to leave detailed message on voice mail for today's visit only Yes, phone # [phone]

## 2018-09-27 LAB
ABBOTT PSA - QUEST: 0.8 NG/ML
ALBUMIN SERPL-MCNC: 4.4 G/DL (ref 3.6–5.1)
ALBUMIN/GLOB SERPL: 1.8 (CALC) (ref 1–2.5)
ALP SERPL-CCNC: 74 U/L (ref 40–115)
ALT SERPL-CCNC: 28 U/L (ref 9–46)
AST SERPL-CCNC: 35 U/L (ref 10–35)
BILIRUB SERPL-MCNC: 1.2 MG/DL (ref 0.2–1.2)
BUN SERPL-MCNC: 13 MG/DL (ref 7–25)
BUN/CREATININE RATIO: NORMAL (CALC) (ref 6–22)
CALCIUM SERPL-MCNC: 9.5 MG/DL (ref 8.6–10.3)
CHLORIDE SERPLBLD-SCNC: 102 MMOL/L (ref 98–110)
CO2 SERPL-SCNC: 22 MMOL/L (ref 20–32)
CREAT SERPL-MCNC: 0.99 MG/DL (ref 0.7–1.25)
EGFR AFRICAN AMERICAN - QUEST: 92 ML/MIN/1.73M2
GFR SERPL CREATININE-BSD FRML MDRD: 80 ML/MIN/1.73M2
GLOBULIN, CALCULATED - QUEST: 2.5 G/DL (CALC) (ref 1.9–3.7)
GLUCOSE - QUEST: 86 MG/DL (ref 65–99)
POTASSIUM SERPL-SCNC: 5.2 MMOL/L (ref 3.5–5.3)
PROT SERPL-MCNC: 6.9 G/DL (ref 6.1–8.1)
SODIUM SERPL-SCNC: 136 MMOL/L (ref 135–146)

## 2018-10-07 ENCOUNTER — MYC MEDICAL ADVICE (OUTPATIENT)
Dept: FAMILY MEDICINE | Facility: CLINIC | Age: 63
End: 2018-10-07

## 2018-10-07 DIAGNOSIS — N52.9 ERECTILE DYSFUNCTION, UNSPECIFIED ERECTILE DYSFUNCTION TYPE: Primary | ICD-10-CM

## 2018-10-08 NOTE — TELEPHONE ENCOUNTER
From: Jose F Cortez  To: Diego Niño MD  Sent: 10/7/2018 11:29 PM CDT  Subject: A follow-up question for a visit within the past seven days    Hi Dr. Niño.   It was great to see you. I hope your wife is doing better. The 100mg works about the same as the other one. Maybe a referral and go the other route. Also,you were going to send a referral for a colonoscopy. Dr. Trent retired and Dr. Diego Lake took over his practice. That's ok, unless you have someone else. Thank you. Jose F.

## 2018-10-11 ENCOUNTER — MYC MEDICAL ADVICE (OUTPATIENT)
Dept: FAMILY MEDICINE | Facility: CLINIC | Age: 63
End: 2018-10-11

## 2018-10-11 NOTE — TELEPHONE ENCOUNTER
From: Jose F Cortez  To: Diego Niño MD  Sent: 10/11/2018 10:40 AM CDT  Subject: A follow-up question for a visit within the past seven days    Thanks Kinsey. My appointment is in [place], November 28th @ 3:00 PM with Dr. Ingram. I appreciate your help.

## 2018-10-16 DIAGNOSIS — J30.1 ACUTE SEASONAL ALLERGIC RHINITIS DUE TO POLLEN: ICD-10-CM

## 2018-10-16 RX ORDER — MONTELUKAST SODIUM 10 MG/1
TABLET ORAL
Qty: 90 TABLET | Refills: 0 | OUTPATIENT
Start: 2018-10-16

## 2018-11-27 ENCOUNTER — TRANSFERRED RECORDS (OUTPATIENT)
Dept: FAMILY MEDICINE | Facility: CLINIC | Age: 63
End: 2018-11-27

## 2019-03-01 ENCOUNTER — DOCUMENTATION ONLY (OUTPATIENT)
Dept: OTHER | Facility: CLINIC | Age: 64
End: 2019-03-01

## 2019-06-18 ENCOUNTER — MYC REFILL (OUTPATIENT)
Dept: FAMILY MEDICINE | Facility: CLINIC | Age: 64
End: 2019-06-18

## 2019-06-18 DIAGNOSIS — N52.9 ERECTILE DYSFUNCTION, UNSPECIFIED ERECTILE DYSFUNCTION TYPE: ICD-10-CM

## 2019-06-18 RX ORDER — SILDENAFIL 100 MG/1
100 TABLET, FILM COATED ORAL DAILY PRN
Qty: 20 TABLET | Refills: 0 | Status: SHIPPED | OUTPATIENT
Start: 2019-06-18 | End: 2019-06-25

## 2019-06-18 NOTE — TELEPHONE ENCOUNTER
Pending Prescriptions:                       Disp   Refills    sildenafil (VIAGRA) 100 MG tablet         20 tab*             Sig: Take 1 tablet (100 mg) by mouth daily as needed           30 min to 4 hrs before sex. Do not use with           nitroglycerin, terazosin or doxazosin.    Please review for [facility]      Last refill was 9- at last med check  Per notes on that date was rtc in one year    Please change qty and fax, or change to 30 day until [facility] gets back?    Fax and close encounter  Thank you  Moni  [phone] (home) [phone] (work)

## 2019-06-25 DIAGNOSIS — N52.9 ERECTILE DYSFUNCTION, UNSPECIFIED ERECTILE DYSFUNCTION TYPE: ICD-10-CM

## 2019-06-25 NOTE — TELEPHONE ENCOUNTER
Pt wanted to switch pharmacies. Lena called asking that a new RX be sent in.    Jose F Cortez is requesting a refill of:    Pending Prescriptions:                       Disp   Refills    sildenafil (VIAGRA) 100 MG tablet         30 tab*1            Sig: Take 1 tablet (100 mg) by mouth daily as needed           30 min to 4 hrs before sex. Do not use with           nitroglycerin, terazosin or doxazosin.

## 2019-06-26 RX ORDER — SILDENAFIL 100 MG/1
100 TABLET, FILM COATED ORAL DAILY PRN
Qty: 30 TABLET | Refills: 1 | Status: SHIPPED | OUTPATIENT
Start: 2019-06-26 | End: 2019-10-23

## 2019-07-31 ENCOUNTER — TRANSFERRED RECORDS (OUTPATIENT)
Dept: FAMILY MEDICINE | Facility: CLINIC | Age: 64
End: 2019-07-31

## 2019-09-27 ENCOUNTER — HEALTH MAINTENANCE LETTER (OUTPATIENT)
Age: 64
End: 2019-09-27

## 2019-09-30 DIAGNOSIS — J30.1 ACUTE SEASONAL ALLERGIC RHINITIS DUE TO POLLEN: ICD-10-CM

## 2019-09-30 RX ORDER — OLOPATADINE HYDROCHLORIDE 1 MG/ML
SOLUTION/ DROPS OPHTHALMIC
Qty: 15 ML | Refills: 0 | OUTPATIENT
Start: 2019-09-30

## 2019-09-30 NOTE — TELEPHONE ENCOUNTER
Refused Prescriptions:                       Disp   Refills    olopatadine (PATANOL) 0.1 % ophthalmic sol*15 mL  0        Sig: INSTILL 1 DROP IN BOTH EYES TWICE DAILY  Refused By: MONI TURNER  Reason for Refusal: Patient needs appointment    Pt due for a yearly fasting ov  Moni  [phone] (home) [phone] (work)

## 2019-10-11 DIAGNOSIS — J30.1 ACUTE SEASONAL ALLERGIC RHINITIS DUE TO POLLEN: ICD-10-CM

## 2019-10-11 RX ORDER — MONTELUKAST SODIUM 10 MG/1
TABLET ORAL
Qty: 90 TABLET | Refills: 0 | COMMUNITY
Start: 2019-10-11

## 2019-10-11 NOTE — TELEPHONE ENCOUNTER
Jose F Cortez is requesting a refill of:    Refused Prescriptions:                       Disp   Refills    montelukast (SINGULAIR) 10 MG tablet [Phar*90 tab*0        Sig: TAKE 1 TABLET(10 MG) BY MOUTH DAILY  Refused By: FADIA OSBORNE  Reason for Refusal: Patient needs appointment    Pt called to schedule an appt, stated can wait for refills.

## 2019-10-14 ENCOUNTER — MYC MEDICAL ADVICE (OUTPATIENT)
Dept: FAMILY MEDICINE | Facility: CLINIC | Age: 64
End: 2019-10-14

## 2019-10-14 DIAGNOSIS — N52.9 ERECTILE DYSFUNCTION, UNSPECIFIED ERECTILE DYSFUNCTION TYPE: ICD-10-CM

## 2019-10-15 RX ORDER — SILDENAFIL 100 MG/1
100 TABLET, FILM COATED ORAL DAILY PRN
Qty: 20 TABLET | Refills: 0 | OUTPATIENT
Start: 2019-10-15

## 2019-10-15 NOTE — TELEPHONE ENCOUNTER
Routing to Dr. Retana. Please advise for [facility]    Jose F Cortez is requesting a refill of:    Pending Prescriptions:                       Disp   Refills    sildenafil (VIAGRA) 100 MG tablet         20 tab*0            Sig: Take 1 tablet (100 mg) by mouth daily as needed           (for ED) 30 min to 4 hrs before sex. Do not use           with nitroglycerin, terazosin or doxazosin.

## 2019-10-23 ENCOUNTER — OFFICE VISIT (OUTPATIENT)
Dept: FAMILY MEDICINE | Facility: CLINIC | Age: 64
End: 2019-10-23

## 2019-10-23 VITALS
WEIGHT: 156.4 LBS | HEIGHT: 67 IN | DIASTOLIC BLOOD PRESSURE: 76 MMHG | HEART RATE: 60 BPM | RESPIRATION RATE: 20 BRPM | SYSTOLIC BLOOD PRESSURE: 140 MMHG | BODY MASS INDEX: 24.55 KG/M2 | TEMPERATURE: 97.6 F

## 2019-10-23 DIAGNOSIS — N52.9 ERECTILE DYSFUNCTION, UNSPECIFIED ERECTILE DYSFUNCTION TYPE: ICD-10-CM

## 2019-10-23 DIAGNOSIS — Z80.42 FAMILY HISTORY OF MALIGNANT NEOPLASM OF PROSTATE: ICD-10-CM

## 2019-10-23 DIAGNOSIS — M77.42 METATARSALGIA OF BOTH FEET: Primary | ICD-10-CM

## 2019-10-23 DIAGNOSIS — M77.41 METATARSALGIA OF BOTH FEET: Primary | ICD-10-CM

## 2019-10-23 DIAGNOSIS — J30.1 ACUTE SEASONAL ALLERGIC RHINITIS DUE TO POLLEN: ICD-10-CM

## 2019-10-23 LAB
ALBUMIN SERPL-MCNC: 4.4 G/DL (ref 3.6–5.1)
ALBUMIN/GLOB SERPL: 1.8 {RATIO} (ref 1–2.5)
ALP SERPL-CCNC: 71 U/L (ref 33–130)
ALT 1742-6: 25 U/L (ref 5–30)
AST 1920-8: 29 U/L (ref 7–31)
BILIRUB SERPL-MCNC: 1.4 MG/DL (ref 0.2–1.2)
BUN SERPL-MCNC: 15 MG/DL (ref 7–25)
BUN/CREATININE RATIO: 14.6 (ref 6–22)
CALCIUM SERPL-MCNC: 9.6 MG/DL (ref 8.6–10.3)
CHLORIDE SERPLBLD-SCNC: 103.6 MMOL/L (ref 98–110)
CO2 SERPL-SCNC: 31.8 MMOL/L (ref 20–32)
CREAT SERPL-MCNC: 1.03 MG/DL (ref 0.7–1.18)
GLOBULIN, CALCULATED - QUEST: 2.5 (ref 1.9–3.7)
GLUCOSE SERPL-MCNC: 94 MG/DL (ref 60–99)
POTASSIUM SERPL-SCNC: 4.53 MMOL/L (ref 3.5–5.3)
PROT SERPL-MCNC: 6.9 G/DL (ref 6.1–8.1)
SODIUM SERPL-SCNC: 140.1 MMOL/L (ref 135–146)

## 2019-10-23 PROCEDURE — 99213 OFFICE O/P EST LOW 20 MIN: CPT | Mod: 25 | Performed by: FAMILY MEDICINE

## 2019-10-23 PROCEDURE — 90686 IIV4 VACC NO PRSV 0.5 ML IM: CPT | Performed by: FAMILY MEDICINE

## 2019-10-23 PROCEDURE — 90471 IMMUNIZATION ADMIN: CPT | Performed by: FAMILY MEDICINE

## 2019-10-23 PROCEDURE — 80053 COMPREHEN METABOLIC PANEL: CPT | Mod: 90 | Performed by: FAMILY MEDICINE

## 2019-10-23 PROCEDURE — 36415 COLL VENOUS BLD VENIPUNCTURE: CPT | Performed by: FAMILY MEDICINE

## 2019-10-23 PROCEDURE — 84153 ASSAY OF PSA TOTAL: CPT | Mod: 90 | Performed by: FAMILY MEDICINE

## 2019-10-23 RX ORDER — OLOPATADINE HYDROCHLORIDE 1 MG/ML
1 SOLUTION/ DROPS OPHTHALMIC 2 TIMES DAILY
Qty: 15 ML | Refills: 3 | Status: SHIPPED | OUTPATIENT
Start: 2019-10-23 | End: 2020-11-06

## 2019-10-23 RX ORDER — CELECOXIB 200 MG/1
200 CAPSULE ORAL DAILY
Qty: 90 CAPSULE | Refills: 3 | Status: SHIPPED | OUTPATIENT
Start: 2019-10-23 | End: 2024-07-10

## 2019-10-23 RX ORDER — MONTELUKAST SODIUM 10 MG/1
1 TABLET ORAL DAILY
Qty: 90 TABLET | Refills: 3 | Status: SHIPPED | OUTPATIENT
Start: 2019-10-23 | End: 2021-10-12

## 2019-10-23 RX ORDER — SILDENAFIL 100 MG/1
100 TABLET, FILM COATED ORAL DAILY PRN
Qty: 30 TABLET | Refills: 1 | Status: SHIPPED | OUTPATIENT
Start: 2019-10-23 | End: 2021-01-13

## 2019-10-23 ASSESSMENT — MIFFLIN-ST. JEOR: SCORE: 1448.06

## 2019-10-23 NOTE — PROGRESS NOTES
SUBJECTIVE:  Jose F Cortez, a 64 year old male scheduled an appointment to discuss the following issues:     Metatarsalgia of both feet  Acute seasonal allergic rhinitis due to pollen  Erectile dysfunction, unspecified erectile dysfunction type  Family history of malignant neoplasm of prostate  Pt here for refills-all conditions doing well, no med side effects , takes celebrex prn, not daily-mainly for foot pains or occasionally neck aching    Medical, social, surgical, and family histories reviewed.    Patient Active Problem List   Diagnosis     Allergic rhinitis     Actinic keratosis     Viral warts     Health Care Home     ACP (advance care planning)     Family history of malignant neoplasm of prostate     Metatarsalgia of both feet     Past Medical History:   Diagnosis Date     Allergic rhinitis, cause unspecified     testing last year-trees     Fam hx-cardiovas dis NEC      Family History   Problem Relation Age of Onset     Alzheimer Disease Mother         dementia/ 72     Diabetes Maternal Grandmother      Cancer No family hx of      Heart Disease Father      Breast Cancer No family hx of      Cancer - colorectal No family hx of      Prostate Cancer Brother      Social History     Socioeconomic History     Marital status:      Spouse name: Peng     Number of children: 1     Years of education: 14     Highest education level: Not on file   Occupational History     Occupation:      Employer: [employer]   Social Needs     Financial resource strain: Not on file     Food insecurity:     Worry: Not on file     Inability: Not on file     Transportation needs:     Medical: Not on file     Non-medical: Not on file   Tobacco Use     Smoking status: Never Smoker     Smokeless tobacco: Never Used   Substance and Sexual Activity     Alcohol use: Yes     Alcohol/week: 10.0 standard drinks     Types: 12 drink(s) per week     Comment: 2 beers daily     Drug use: No     Sexual activity: Yes      Partners: Female     Birth control/protection: Surgical     Comment: tubal ligation   Lifestyle     Physical activity:     Days per week: Not on file     Minutes per session: Not on file     Stress: Not on file   Relationships     Social connections:     Talks on phone: Not on file     Gets together: Not on file     Attends Congregation service: Not on file     Active member of club or organization: Not on file     Attends meetings of clubs or organizations: Not on file     Relationship status: Not on file     Intimate partner violence:     Fear of current or ex partner: Not on file     Emotionally abused: Not on file     Physically abused: Not on file     Forced sexual activity: Not on file   Other Topics Concern      Service No     Blood Transfusions No     Caffeine Concern No     Occupational Exposure No     Hobby Hazards No     Sleep Concern No     Stress Concern No     Weight Concern No     Special Diet No     Back Care Not Asked     Exercise Yes     Bike Helmet Not Asked     Seat Belt Yes     Self-Exams Not Asked   Social History Narrative     Not on file     Past Surgical History:   Procedure Laterality Date     HC COLONOSCOPY THRU STOMA, DIAGNOSTIC  3/2008    Normal / Dr Trent     aspirin EC 81 MG tablet, Take 81 mg by mouth daily.  loratadine (CLARITIN) 10 MG tablet, Take 10 mg by mouth daily.  mometasone (ELOCON) 0.1 % cream, Apply topically 2 times daily as needed Apply sparingly to affected area.  Do not apply to face.    No current facility-administered medications on file prior to visit.        Allergies: Tetanus toxoid    Immunization History   Administered Date(s) Administered     Influenza Vaccine IM > 6 months Valent IIV4 11/06/2013, 12/01/2014, 10/26/2015, 11/09/2016, 11/08/2017, 09/26/2018     Pneumo Conj 13-V (2010&after) 12/01/2014     Pneumococcal 23 valent 09/26/2018     TD (ADULT, 7+) 01/01/1992     Zoster vaccine, live 12/06/2014        ROS:  CONSTITUTIONAL: NEGATIVE for fever,  "chills  EYES: NEGATIVE for vision changes   RESP: NEGATIVE for significant cough or SOB  CV: NEGATIVE for chest pain, palpitations   GI: NEGATIVE for nausea, abdominal pain, heartburn, or change in bowel habits  : NEGATIVE for frequency, dysuria, or hematuria  MUSCULOSKELETAL: NEGATIVE for significant arthralgias or myalgia  NEURO: NEGATIVE for weakness, dizziness or paresthesias or headache    OBJECTIVE:  BP (!) 140/76 (BP Location: Left arm, Patient Position: Chair, Cuff Size: Adult Regular)   Pulse 60   Resp 20   Ht 1.702 m (5' 7\")   Wt 70.9 kg (156 lb 6.4 oz)   BMI 24.50 kg/m    EXAM:  GENERAL APPEARANCE: healthy, alert and no distress  EYES: EOMI,  PERRL  HENT: ear canals and TM's normal and nose and mouth without ulcers or lesions  RESP: lungs clear to auscultation - no rales, rhonchi or wheezes  CV: regular rates and rhythm, normal S1 S2, no S3 or S4 and no murmur, click or rub -  ABDOMEN:  soft, nontender, no HSM or masses and bowel sounds normal    ASSESSMENT/PLAN:  (M77.41,  M77.42) Metatarsalgia of both feet  (primary encounter diagnosis)  Comment: stable  Plan: celecoxib (CELEBREX) 200 MG capsule,         Comprehensive Metobolic Panel ([facility]), VENOUS         COLLECTION        continue current medications at current doses     (J30.1) Acute seasonal allergic rhinitis due to pollen  Comment: stable  Plan: montelukast (SINGULAIR) 10 MG tablet,         olopatadine (PATANOL) 0.1 % ophthalmic solution        .ccocn     (N52.9) Erectile dysfunction, unspecified erectile dysfunction type  Comment: stable symptomatically   Plan: sildenafil (VIAGRA) 100 MG tablet        continue current medications at current doses     (Z80.42) Family history of malignant neoplasm of prostate  Comment:   Plan: VENOUS COLLECTION, HCL PSA, SCREENING ([facility])             "

## 2019-10-23 NOTE — NURSING NOTE
Questioned patient about current smoking habits.  Pt. no exposure to second hand smoke.  PULSE regular  My Chart: active  CLASSIFICATION OF OVERWEIGHT AND OBESITY BY BMI                        Obesity Class           BMI(kg/m2)  Underweight                                    < 18.5  Normal                                         18.5-24.9  Overweight                                     25.0-29.9  OBESITY                     I                  30.0-34.9                             II                 35.0-39.9  EXTREME OBESITY             III                >40                            Patient's  BMI Body mass index is 24.5 kg/m .  http://hin.nhlbi.nih.gov/menuplanner/menu.cgi  Pre-visit planning  Immunizations - up to date  Colonoscopy - is up to date  Mammogram -   Asthma -   PHQ9 -    REFUGIO-7 -

## 2019-10-25 LAB — ABBOTT PSA - QUEST: 0.9 NG/ML

## 2019-12-11 ENCOUNTER — OFFICE VISIT (OUTPATIENT)
Dept: FAMILY MEDICINE | Facility: CLINIC | Age: 64
End: 2019-12-11

## 2019-12-11 VITALS
OXYGEN SATURATION: 98 % | SYSTOLIC BLOOD PRESSURE: 140 MMHG | HEART RATE: 72 BPM | BODY MASS INDEX: 25.56 KG/M2 | DIASTOLIC BLOOD PRESSURE: 78 MMHG | TEMPERATURE: 97.7 F | WEIGHT: 163.2 LBS

## 2019-12-11 DIAGNOSIS — R05.9 COUGH: Primary | ICD-10-CM

## 2019-12-11 PROCEDURE — 99213 OFFICE O/P EST LOW 20 MIN: CPT | Performed by: FAMILY MEDICINE

## 2019-12-11 RX ORDER — AZITHROMYCIN 250 MG/1
TABLET, FILM COATED ORAL
Qty: 6 TABLET | Refills: 0 | Status: SHIPPED | OUTPATIENT
Start: 2019-12-11 | End: 2021-07-12

## 2019-12-11 NOTE — PROGRESS NOTES
SUBJECTIVE:   Jose F Cortez is a 64 year old male who complains of nasal congestion, facial pressure, mild sore throat, cough and fatigue for 30 days. He denies a history of productive cough, sweats, chills, myalgias and shortness of breath and denies a history of asthma. Patient does not smoke cigarettes.    Pt has tried sudafed with slight relief    Granddaughter with same symptoms and was put on Zpack and got better     OBJECTIVE:BP (!) 140/78 (BP Location: Right arm, Patient Position: Sitting, Cuff Size: Adult Large)   Pulse 72   Temp 97.7  F (36.5  C) (Oral)   Wt 74 kg (163 lb 3.2 oz)   SpO2 98%   BMI 25.56 kg/m     He appears well, vital signs are as noted by the nurse. Ears normal.  Throat and pharynx normal.  Neck supple. No adenopathy in the neck. Nose is congested. Sinuses  tender. The chest is clear, without wheezes or rales.    ASSESSMENT:   Sinusitis vs bronchitis-symptoms mild but has been going for 5 weeks, does have allergies but dos not sound like flare     Discussed viral vs. bacterial infections and need to avoid unnecessary prescribing of antibiotics to ensure that no resistance will develop. Will give prescription for antibiotic (see orders) to fill is symptoms do not improve in the next 2-3 days.     PLAN:zpack, I reviewed the risks, benefits, and possible side effects of the medication.  The patient had an opportunity to ask any questions regarding the treatment plan. The patient was encouraged to call my office if any problems.   Symptomatic therapy suggested: push fluids, rest and use acetaminophen, ibuprofen as needed. Call or return to clinic prn if these symptoms worsen or fail to improve as anticipated.

## 2019-12-11 NOTE — NURSING NOTE
Jose F is here for cough x 5 weeks    Pre-visit Screening:  Immunizations:  up to date  Colonoscopy:  is up to date  Mammogram: NA  Asthma Action Test/Plan:  CALLY  PHQ9:  NA  GAD7:  NA  Questioned patient about current smoking habits Pt. has never smoked.  Ok to leave detailed message on voice mail for today's visit only Yes, phone # [phone]

## 2020-01-22 ENCOUNTER — MYC MEDICAL ADVICE (OUTPATIENT)
Dept: FAMILY MEDICINE | Facility: CLINIC | Age: 65
End: 2020-01-22

## 2020-01-22 NOTE — LETTER
[facility]  [address]  [place]  [address]    January 28, 2020            Jose F Cortez  [address] [address]        Dear Jose F Cortez        We have the Shingrix vaccine in. You can call [phone] to make a nurse only appointment for the 2 dose vaccine series. We ask that you check with your insurance to see where you get the best coverage. It can be either at the clinic or at a pharmacy.  If you are no longer wanting this series or going to the pharmacy, please respond to let us know. If we do not hear from you, we will remove you from our waiting list            [facility]

## 2020-02-11 ENCOUNTER — ALLIED HEALTH/NURSE VISIT (OUTPATIENT)
Dept: FAMILY MEDICINE | Facility: CLINIC | Age: 65
End: 2020-02-11

## 2020-02-11 DIAGNOSIS — Z23 NEED FOR VACCINATION: Primary | ICD-10-CM

## 2020-02-11 PROCEDURE — 90471 IMMUNIZATION ADMIN: CPT | Performed by: FAMILY MEDICINE

## 2020-02-11 PROCEDURE — 90750 HZV VACC RECOMBINANT IM: CPT | Performed by: FAMILY MEDICINE

## 2020-03-15 ENCOUNTER — HEALTH MAINTENANCE LETTER (OUTPATIENT)
Age: 65
End: 2020-03-15

## 2020-07-15 ENCOUNTER — ALLIED HEALTH/NURSE VISIT (OUTPATIENT)
Dept: FAMILY MEDICINE | Facility: CLINIC | Age: 65
End: 2020-07-15

## 2020-07-15 DIAGNOSIS — Z23 NEED FOR VACCINATION: Primary | ICD-10-CM

## 2020-07-15 PROCEDURE — 90471 IMMUNIZATION ADMIN: CPT | Performed by: FAMILY MEDICINE

## 2020-07-15 PROCEDURE — 90750 HZV VACC RECOMBINANT IM: CPT | Performed by: FAMILY MEDICINE

## 2020-10-26 ENCOUNTER — ALLIED HEALTH/NURSE VISIT (OUTPATIENT)
Dept: FAMILY MEDICINE | Facility: CLINIC | Age: 65
End: 2020-10-26

## 2020-10-26 DIAGNOSIS — Z23 NEED FOR VACCINATION: Primary | ICD-10-CM

## 2020-10-26 PROCEDURE — 90471 IMMUNIZATION ADMIN: CPT | Performed by: FAMILY MEDICINE

## 2020-10-26 PROCEDURE — 90686 IIV4 VACC NO PRSV 0.5 ML IM: CPT | Performed by: FAMILY MEDICINE

## 2020-11-06 DIAGNOSIS — J30.1 ACUTE SEASONAL ALLERGIC RHINITIS DUE TO POLLEN: ICD-10-CM

## 2020-11-06 RX ORDER — OLOPATADINE HYDROCHLORIDE 1 MG/ML
SOLUTION/ DROPS OPHTHALMIC
Qty: 5 ML | Refills: 0 | Status: SHIPPED | OUTPATIENT
Start: 2020-11-06 | End: 2021-07-12

## 2020-11-06 NOTE — TELEPHONE ENCOUNTER
Pt has not been seen in over a year. Eye drops last filled a year ago. Please advise. Thanks     Pending Prescriptions:                       Disp   Refills    olopatadine (PATANOL) 0.1 % ophthalmic so*15 mL  3            Sig: INSTILL 1 DROP IN BOTH EYES TWICE DAILY

## 2021-01-10 DIAGNOSIS — N52.9 ERECTILE DYSFUNCTION, UNSPECIFIED ERECTILE DYSFUNCTION TYPE: ICD-10-CM

## 2021-01-11 ENCOUNTER — MYC MEDICAL ADVICE (OUTPATIENT)
Dept: FAMILY MEDICINE | Facility: CLINIC | Age: 66
End: 2021-01-11

## 2021-01-11 RX ORDER — SILDENAFIL 100 MG/1
TABLET, FILM COATED ORAL
Qty: 30 TABLET | Refills: 1 | COMMUNITY
Start: 2021-01-11

## 2021-01-11 NOTE — TELEPHONE ENCOUNTER
Jose F Cortez is requesting a refill of:    Refused Prescriptions:                       Disp   Refills    sildenafil (VIAGRA) 100 MG tablet [Pharmac*30 tab*1        Sig: TAKE ONE TABLET BY MOUTH EVERY DAY AS NEEDED 30           MINUTES TO 4 HOURS BEFORE SEX, DO NOT USE WITH           NITROGLYCERIN, TERAZOSIN, OR DOXAZOSIN  Refused By: FADIA OSBORNE  Reason for Refusal: Patient needs appointment      Last med recheck 10/23/19, due for yearly OV. Sent [employer] message.

## 2021-01-12 ENCOUNTER — MYC MEDICAL ADVICE (OUTPATIENT)
Dept: FAMILY MEDICINE | Facility: CLINIC | Age: 66
End: 2021-01-12

## 2021-01-12 DIAGNOSIS — N52.9 ERECTILE DYSFUNCTION, UNSPECIFIED ERECTILE DYSFUNCTION TYPE: ICD-10-CM

## 2021-01-13 DIAGNOSIS — N52.9 ERECTILE DYSFUNCTION, UNSPECIFIED ERECTILE DYSFUNCTION TYPE: ICD-10-CM

## 2021-01-13 RX ORDER — SILDENAFIL 100 MG/1
100 TABLET, FILM COATED ORAL DAILY PRN
Qty: 30 TABLET | Refills: 0 | Status: SHIPPED | OUTPATIENT
Start: 2021-01-13 | End: 2021-01-15

## 2021-01-13 NOTE — TELEPHONE ENCOUNTER
[facility] Last med recheck 10/23/19, are you willing to send in short supply?    Jose F Cortez is requesting a refill of:    Pending Prescriptions:                       Disp   Refills    sildenafil (VIAGRA) 100 MG tablet         30 tab*0            Sig: Take 1 tablet (100 mg) by mouth daily as needed           (for ED) 30 min to 4 hrs before sex. Do not use           with nitroglycerin, terazosin or doxazosin.

## 2021-01-14 RX ORDER — SILDENAFIL 100 MG/1
TABLET, FILM COATED ORAL
Qty: 30 TABLET | Refills: 1 | COMMUNITY
Start: 2021-01-14

## 2021-01-14 NOTE — TELEPHONE ENCOUNTER
Jose F Cortez is requesting a refill of:    Refused Prescriptions:                       Disp   Refills    sildenafil (VIAGRA) 100 MG tablet [Pharmac*30 tab*1        Sig: TAKE ONE TABLET BY MOUTH EVERY DAY AS NEEDED 30           MINUTES TO 4 HOURS BEFORE SEX, DO NOT USE WITH           NITROGLYCERIN, TERAZOSIN, OR DOXAZOSIN  Refused By: BIBIANA CHEATHAM  Reason for Refusal: Patient needs appointment    Last seen on 12/11/19

## 2021-01-15 DIAGNOSIS — N52.9 ERECTILE DYSFUNCTION, UNSPECIFIED ERECTILE DYSFUNCTION TYPE: ICD-10-CM

## 2021-01-15 RX ORDER — SILDENAFIL 100 MG/1
100 TABLET, FILM COATED ORAL DAILY PRN
Qty: 30 TABLET | Refills: 1 | Status: SHIPPED | OUTPATIENT
Start: 2021-01-15 | End: 2021-07-12

## 2021-01-15 NOTE — TELEPHONE ENCOUNTER
Pt called to say he would like it sent to [facility] instead of [facility]. [facility] sent 30 tabs on 1/13/2021 to [facility] can you resend this for him. Thanks     Pending Prescriptions:                       Disp   Refills    sildenafil (VIAGRA) 100 MG tablet         30 tab*0            Sig: Take 1 tablet (100 mg) by mouth daily as needed           (for ED) 30 min to 4 hrs before sex. Do not use           with nitroglycerin, terazosin or doxazosin.

## 2021-05-08 ENCOUNTER — HEALTH MAINTENANCE LETTER (OUTPATIENT)
Age: 66
End: 2021-05-08

## 2021-07-12 ENCOUNTER — OFFICE VISIT (OUTPATIENT)
Dept: FAMILY MEDICINE | Facility: CLINIC | Age: 66
End: 2021-07-12

## 2021-07-12 VITALS
TEMPERATURE: 97.9 F | WEIGHT: 168.3 LBS | DIASTOLIC BLOOD PRESSURE: 86 MMHG | HEIGHT: 67 IN | HEART RATE: 74 BPM | BODY MASS INDEX: 26.42 KG/M2 | SYSTOLIC BLOOD PRESSURE: 138 MMHG

## 2021-07-12 DIAGNOSIS — N52.9 ERECTILE DYSFUNCTION, UNSPECIFIED ERECTILE DYSFUNCTION TYPE: ICD-10-CM

## 2021-07-12 DIAGNOSIS — J30.1 ACUTE SEASONAL ALLERGIC RHINITIS DUE TO POLLEN: ICD-10-CM

## 2021-07-12 PROCEDURE — 99213 OFFICE O/P EST LOW 20 MIN: CPT | Performed by: FAMILY MEDICINE

## 2021-07-12 RX ORDER — OLOPATADINE HYDROCHLORIDE 1 MG/ML
1 SOLUTION/ DROPS OPHTHALMIC 2 TIMES DAILY
Qty: 5 ML | Refills: 3 | Status: SHIPPED | OUTPATIENT
Start: 2021-07-12 | End: 2021-11-08

## 2021-07-12 RX ORDER — SILDENAFIL 100 MG/1
100 TABLET, FILM COATED ORAL DAILY PRN
Qty: 30 TABLET | Refills: 1 | Status: SHIPPED | OUTPATIENT
Start: 2021-07-12 | End: 2023-05-01 | Stop reason: ALTCHOICE

## 2021-07-12 ASSESSMENT — MIFFLIN-ST. JEOR: SCORE: 1497.03

## 2021-07-12 NOTE — NURSING NOTE
Jose F Cortez is here for a medication check and refill.    Questioned patient about current smoking habits.  Pt. has never smoked.  PULSE regular  My Chart: active  CLASSIFICATION OF OVERWEIGHT AND OBESITY BY BMI                        Obesity Class           BMI(kg/m2)  Underweight                                    < 18.5  Normal                                         18.5-24.9  Overweight                                     25.0-29.9  OBESITY                     I                  30.0-34.9                             II                 35.0-39.9  EXTREME OBESITY             III                >40                            Patient's  BMI Body mass index is 26.36 kg/m .  http://hin.nhlbi.nih.gov/menuplanner/menu.cgi  Pre-visit planning  Immunizations - up to date  Colonoscopy - is up to date  Mammogram -   Asthma -   PHQ9 -    REFUGIO-7 -

## 2021-07-12 NOTE — PROGRESS NOTES
"SUBJECTIVE:  Jose F Cortez, a 65 year old male scheduled an appointment to discuss the following issues:     Erectile dysfunction, unspecified erectile dysfunction type  Acute seasonal allergic rhinitis due to pollen  Pt states he si doing well , using meds prn, no med side effects   Medical, social, surgical, and family histories reviewed.    ROS:  CONSTITUTIONAL: NEGATIVE for fever, chills  EYES: NEGATIVE for vision changes   RESP: NEGATIVE for significant cough or SOB  CV: NEGATIVE for chest pain, palpitations   GI: NEGATIVE for nausea, abdominal pain, heartburn, or change in bowel habits  : NEGATIVE for frequency, dysuria, or hematuria  MUSCULOSKELETAL: NEGATIVE for significant arthralgias or myalgia  NEURO: NEGATIVE for weakness, dizziness or paresthesias or headache    OBJECTIVE:  /86 (BP Location: Right arm, Patient Position: Chair, Cuff Size: Adult Regular)   Pulse 74   Temp 97.9  F (36.6  C)   Ht 1.702 m (5' 7\")   Wt 76.3 kg (168 lb 4.8 oz)   BMI 26.36 kg/m    EXAM:  GENERAL APPEARANCE: healthy, alert and no distress  EYES: EOMI,  PERRL  HENT: ear canals and TM's normal and nose and mouth without ulcers or lesions  RESP: lungs clear to auscultation - no rales, rhonchi or wheezes  CV: regular rates and rhythm, normal S1 S2, no S3 or S4 and no murmur, click or rub -  ABDOMEN:  soft, nontender, no HSM or masses and bowel sounds normal    ASSESSMENT/PLAN:  (N52.9) Erectile dysfunction, unspecified erectile dysfunction type  Comment: well controlled  Plan: sildenafil (VIAGRA) 100 MG tablet        continue current medications at current doses     (J30.1) Acute seasonal allergic rhinitis due to pollen  Comment: well controlled  Plan: olopatadine (PATANOL) 0.1 % ophthalmic solution        continue current medications at current doses      "

## 2021-09-20 ENCOUNTER — TRANSFERRED RECORDS (OUTPATIENT)
Dept: FAMILY MEDICINE | Facility: CLINIC | Age: 66
End: 2021-09-20

## 2021-10-06 DIAGNOSIS — Z80.42 FAMILY HISTORY OF MALIGNANT NEOPLASM OF PROSTATE: Primary | ICD-10-CM

## 2021-10-06 DIAGNOSIS — Z23 NEED FOR VACCINATION: ICD-10-CM

## 2021-10-06 LAB
CHOLEST SERPL-MCNC: 205 MG/DL (ref 0–199)
CHOLEST/HDLC SERPL: 3 {RATIO} (ref 0–5)
HDLC SERPL-MCNC: 71 MG/DL (ref 40–150)
LDLC SERPL CALC-MCNC: 123 MG/DL (ref 0–130)
TRIGL SERPL-MCNC: 54 MG/DL (ref 0–149)

## 2021-10-06 PROCEDURE — 90686 IIV4 VACC NO PRSV 0.5 ML IM: CPT | Performed by: FAMILY MEDICINE

## 2021-10-06 PROCEDURE — 90471 IMMUNIZATION ADMIN: CPT | Performed by: FAMILY MEDICINE

## 2021-10-06 PROCEDURE — 84153 ASSAY OF PSA TOTAL: CPT | Mod: 90 | Performed by: FAMILY MEDICINE

## 2021-10-06 PROCEDURE — 80061 LIPID PANEL: CPT | Performed by: FAMILY MEDICINE

## 2021-10-06 PROCEDURE — 36415 COLL VENOUS BLD VENIPUNCTURE: CPT | Performed by: FAMILY MEDICINE

## 2021-10-06 NOTE — NURSING NOTE
Pt had orders from     [facility]  Dr. Ingram  Phone [phone]    Fax [phone]    Pt also had a flu shot

## 2021-10-07 ENCOUNTER — MYC MEDICAL ADVICE (OUTPATIENT)
Dept: FAMILY MEDICINE | Facility: CLINIC | Age: 66
End: 2021-10-07

## 2021-10-07 LAB — ABBOTT PSA - QUEST: 1.02 NG/ML

## 2021-10-11 DIAGNOSIS — J30.1 ACUTE SEASONAL ALLERGIC RHINITIS DUE TO POLLEN: ICD-10-CM

## 2021-10-12 RX ORDER — MONTELUKAST SODIUM 10 MG/1
TABLET ORAL
Qty: 90 TABLET | Refills: 1 | Status: SHIPPED | OUTPATIENT
Start: 2021-10-12 | End: 2022-04-13

## 2021-10-12 NOTE — TELEPHONE ENCOUNTER
Last filled in 2019. Pt was here in 7/2021. Please advise Thanks     Pending Prescriptions:                       Disp   Refills    montelukast (SINGULAIR) 10 MG tablet [Pha*90 tab*             Sig: TAKE 1 TABLET(10 MG) BY MOUTH DAILY

## 2021-10-29 ENCOUNTER — TRANSFERRED RECORDS (OUTPATIENT)
Dept: FAMILY MEDICINE | Facility: CLINIC | Age: 66
End: 2021-10-29

## 2021-11-08 DIAGNOSIS — J30.1 ACUTE SEASONAL ALLERGIC RHINITIS DUE TO POLLEN: ICD-10-CM

## 2021-11-08 RX ORDER — OLOPATADINE HYDROCHLORIDE 1 MG/ML
SOLUTION/ DROPS OPHTHALMIC
Qty: 5 ML | Refills: 3 | Status: SHIPPED | OUTPATIENT
Start: 2021-11-08 | End: 2022-04-08

## 2021-11-08 NOTE — TELEPHONE ENCOUNTER
Jose F Cortez is requesting a refill of:    Pending Prescriptions:                       Disp   Refills    olopatadine (PATANOL) 0.1 % ophthalmic so*5 mL   3            Sig: INSTILL 1 DROP IN BOTH EYES TWICE DAILY    Please close encounter if RX was sent. Thanks, Fadia

## 2021-11-18 ENCOUNTER — OFFICE VISIT (OUTPATIENT)
Dept: FAMILY MEDICINE | Facility: CLINIC | Age: 66
End: 2021-11-18

## 2021-11-18 VITALS
HEART RATE: 60 BPM | DIASTOLIC BLOOD PRESSURE: 84 MMHG | BODY MASS INDEX: 26.37 KG/M2 | TEMPERATURE: 98.2 F | OXYGEN SATURATION: 97 % | WEIGHT: 168 LBS | SYSTOLIC BLOOD PRESSURE: 132 MMHG | HEIGHT: 67 IN

## 2021-11-18 DIAGNOSIS — S49.92XA SHOULDER INJURY, LEFT, INITIAL ENCOUNTER: ICD-10-CM

## 2021-11-18 DIAGNOSIS — H69.92 EUSTACHIAN TUBE DYSFUNCTION, LEFT: Primary | ICD-10-CM

## 2021-11-18 DIAGNOSIS — R09.81 NASAL CONGESTION: ICD-10-CM

## 2021-11-18 LAB — COVID-19: NEGATIVE

## 2021-11-18 PROCEDURE — G2023 SPECIMEN COLLECT COVID-19: HCPCS | Performed by: PHYSICIAN ASSISTANT

## 2021-11-18 PROCEDURE — 87635 SARS-COV-2 COVID-19 AMP PRB: CPT | Performed by: PHYSICIAN ASSISTANT

## 2021-11-18 PROCEDURE — 99213 OFFICE O/P EST LOW 20 MIN: CPT | Performed by: PHYSICIAN ASSISTANT

## 2021-11-18 ASSESSMENT — MIFFLIN-ST. JEOR: SCORE: 1490.67

## 2021-11-18 NOTE — PROGRESS NOTES
"Assessment & Plan     Eustachian tube dysfunction, left  Afrin 30 min prior to flight.   Start Flonase daily for 2-3 weeks      Nasal congestion  - COVID-19 ([facility])  - [place] SPECIMEN COLLECT COVID-19    RTC if sx not improving as expected            AMBER Leahy  [facility]    Subjective     Nursing Notes:   Ynes Finney CMA  11/18/2021  4:54 PM  Signed  Chief Complaint   Patient presents with     Ear Problem     left ear plugged, started this morning, takes OTC allergy medications     Pre-visit Screening:  Immunizations:  up to date  Colonoscopy:  is up to date  Mammogram: NA  Asthma Action Test/Plan:  NA  PHQ9:  NA  GAD7:  NA  Questioned patient about current smoking habits Pt. has never smoked.  Ok to leave detailed message on voice mail for today's visit only Yes, phone # [phone]              Jose F Cortez is a 66 year old male who presents to clinic today for the following health issues:     HPI         Here with Left ear plugging.  Has had some sinus congestion that started this am    No cough/SOB  No fevers     Also had booster - Beginning of Oct.        Objective    /84 (BP Location: Left arm, Patient Position: Sitting, Cuff Size: Adult Large)   Pulse 60   Temp 98.2  F (36.8  C) (Temporal)   Ht 1.702 m (5' 7\")   Wt 76.2 kg (168 lb)   SpO2 97%   BMI 26.31 kg/m    Body mass index is 26.31 kg/m .  Physical Exam   GENERAL: healthy, alert and no distress  EYES: Eyes grossly normal to inspection, PERRL and conjunctivae and sclerae normal  HENT: clear fluid behind left TM  Right canal and TM normal  nose and mouth without ulcers or lesions  NECK: no adenopathy, no asymmetry, masses, or scars and thyroid normal to palpation  RESP: lungs clear to auscultation - no rales, rhonchi or wheezes  CV: regular rate and rhythm, normal S1 S2, no S3 or S4, no murmur, click or rub, no peripheral edema and peripheral pulses strong  MS: no gross musculoskeletal defects noted, no " edema      Labs Resulted Today:   Results for orders placed or performed in visit on 11/18/21   COVID-19 ([facility])     Status: None   Result Value Ref Range    COVID-19 Negative

## 2021-11-18 NOTE — NURSING NOTE
Chief Complaint   Patient presents with     Ear Problem     left ear plugged, started this morning, takes OTC allergy medications     Pre-visit Screening:  Immunizations:  up to date  Colonoscopy:  is up to date  Mammogram: NA  Asthma Action Test/Plan:  NA  PHQ9:  NA  GAD7:  NA  Questioned patient about current smoking habits Pt. has never smoked.  Ok to leave detailed message on voice mail for today's visit only Yes, phone # [phone]

## 2022-01-02 ENCOUNTER — MYC MEDICAL ADVICE (OUTPATIENT)
Dept: FAMILY MEDICINE | Facility: CLINIC | Age: 67
End: 2022-01-02

## 2022-01-06 ENCOUNTER — OFFICE VISIT (OUTPATIENT)
Dept: FAMILY MEDICINE | Facility: CLINIC | Age: 67
End: 2022-01-06

## 2022-01-06 VITALS
TEMPERATURE: 97.9 F | DIASTOLIC BLOOD PRESSURE: 82 MMHG | WEIGHT: 174.6 LBS | BODY MASS INDEX: 27.4 KG/M2 | OXYGEN SATURATION: 98 % | SYSTOLIC BLOOD PRESSURE: 130 MMHG | HEIGHT: 67 IN | HEART RATE: 62 BPM

## 2022-01-06 DIAGNOSIS — S49.92XA SHOULDER INJURY, LEFT, INITIAL ENCOUNTER: ICD-10-CM

## 2022-01-06 DIAGNOSIS — M25.512 ACUTE PAIN OF LEFT SHOULDER: Primary | ICD-10-CM

## 2022-01-06 PROCEDURE — 73030 X-RAY EXAM OF SHOULDER: CPT | Mod: LT | Performed by: STUDENT IN AN ORGANIZED HEALTH CARE EDUCATION/TRAINING PROGRAM

## 2022-01-06 PROCEDURE — 99213 OFFICE O/P EST LOW 20 MIN: CPT | Performed by: STUDENT IN AN ORGANIZED HEALTH CARE EDUCATION/TRAINING PROGRAM

## 2022-01-06 ASSESSMENT — MIFFLIN-ST. JEOR: SCORE: 1520.61

## 2022-01-06 NOTE — PATIENT INSTRUCTIONS
Schedule MRI at   [facility]  [phone] -- appt line    Plan to see me back after scan is done    Continue ibuprofen or celebrex (not both together)

## 2022-01-06 NOTE — NURSING NOTE
Chief Complaint   Patient presents with     Shoulder Pain     left shoulder pain, slipped on ice on 12/26, loss of ROM, pain with lifting arm upwards, taking ibuprofen      Pre-visit Screening:  Immunizations:  up to date  Colonoscopy:  is up to date  Mammogram: NA  Asthma Action Test/Plan:  NA  PHQ9:  NA  GAD7:  NA  Questioned patient about current smoking habits Pt. has never smoked.  Ok to leave detailed message on voice mail for today's visit only Yes, phone # [phone]

## 2022-01-13 ENCOUNTER — OFFICE VISIT (OUTPATIENT)
Dept: FAMILY MEDICINE | Facility: CLINIC | Age: 67
End: 2022-01-13

## 2022-01-13 VITALS
SYSTOLIC BLOOD PRESSURE: 136 MMHG | TEMPERATURE: 98 F | BODY MASS INDEX: 26.63 KG/M2 | WEIGHT: 170 LBS | HEART RATE: 59 BPM | RESPIRATION RATE: 20 BRPM | OXYGEN SATURATION: 96 % | DIASTOLIC BLOOD PRESSURE: 78 MMHG

## 2022-01-13 DIAGNOSIS — S49.92XD INJURY OF LEFT SHOULDER, SUBSEQUENT ENCOUNTER: ICD-10-CM

## 2022-01-13 DIAGNOSIS — S42.255D CLOSED NONDISPLACED FRACTURE OF GREATER TUBEROSITY OF LEFT HUMERUS WITH ROUTINE HEALING, SUBSEQUENT ENCOUNTER: Primary | ICD-10-CM

## 2022-01-13 PROCEDURE — 99213 OFFICE O/P EST LOW 20 MIN: CPT | Performed by: STUDENT IN AN ORGANIZED HEALTH CARE EDUCATION/TRAINING PROGRAM

## 2022-01-13 NOTE — PROGRESS NOTES
ASSESSMENT & PLAN    1. Closed nondisplaced fracture of greater tuberosity of left humerus with routine healing, subsequent encounter  2. Injury of left shoulder, subsequent encounter  Follow-up of left shoulder injury 12/26, reviewed MRI report and images most notable for nondisplaced greater tuberosity fracture. Other chronic findings also discussed briefly but do not suspect contributing to sx. We discussed the nature of greater tuberosity fractures and recommended tx including avoidance of heavy lifting or repeat trauma, PT program to work on early ROM, and progression to cuff program over next 4-6 weeks. Patient to follow up with me in 6 weeks, sooner with any worsening pain or stiffness.   - Physical Therapy Referral; Future    Patient Instructions   You have a nondisplaced greater tuberosity fracture (part of the bone where you rotator cuff tendon attaches)    No heavy lifting, careful to avoid further injury for another month    Schedule PT at [facility]   [address] [address]  [phone] -- appt line     Please come back to see me in about 6 weeks, sooner with any concerns       Nadeem Baker MD, [facility]  Primary Care Sports Medicine   -----    SUBJECTIVE:  Jose F Cortez is a 66 year old male who is seen in follow-up for left shoulder injury.They were last seen 1/6/2022.     Since their last visit reports mild improvement.   No new sx or concerns.   MRI done at [facility].     The patient is seen by themselves.    Patient's past medical, surgical, social, and family histories were reviewed today and no changes are noted.    REVIEW OF SYSTEMS:  Constitutional: NEGATIVE for fever, chills, change in weight  Skin: NEGATIVE for worrisome rashes, moles or lesions  GI/: NEGATIVE for bowel or bladder changes  Neuro: NEGATIVE for weakness, dizziness or paresthesias    OBJECTIVE:  /78 (BP Location: Left arm, Patient Position: Sitting, Cuff Size: Adult Large)   Pulse 59    Temp 98  F (36.7  C) (Temporal)   Resp 20   Wt 77.1 kg (170 lb)   SpO2 96%   BMI 26.63 kg/m     General: healthy, alert and in no distress  HEENT: no scleral icterus or conjunctival erythema  Skin: no suspicious lesions or rash.   Resp: normal respiratory effort without conversational dyspnea   Psych: normal mood and affect  Gait: normal steady gait     MSK:      Imaging:  EXAM: MRI of the LEFT SHOULDER, without contrast    CLINICAL INFORMATION: Male, 66 years old, with left shoulder pain with lifting since falling on ice on 12/26/2021.    INDICATION: Evaluate shoulder pain.    PRIOR SURGERY: None reported.    PLAIN FILMS: None available.    COMPARISONS: No prior MRIs available.    TECHNICAL INFORMATION: Using a 1.5T MR scanner and a localizing surface coil:    coronal obliques: PD, T2FS    sagittal obliques: T2, PDFS    axials: PD, PDFS    SEDATION: None    CONTRAST: None    FINDINGS:    Bones:    Proximal humerus: Nondisplaced fracture involving the anterior one half of the greater tuberosity measuring 2.1 x 2.5 cm (sagittal T2 series 7 image 18 and coronal PD series 5 image 16). This is associated with moderate surrounding bone marrow edema (sagittal PDFS series 6 image 15-20). No humeral Hill-Sachs or reverse Hill-Sachs lesion/impaction or contusion.    Glenoid: No fracture or marrow edema/pathology. No osseous Bankart lesion.    Rotator cuff and muscles/tendons:    Supraspinatus: Mild supraspinatus tendinopathy, without tendon tear or muscle atrophy.    Infraspinatus: No tendinopathy, tear or atrophy.    Teres minor: No tendinopathy, tear or atrophy.    Subscapularis: Mild tendinopathy of the superior distal subscapularis with partial-thickness articular/interstitial tearing at the superior leading edge of the tendon over an area measuring 6 x 5 mm and involving approximately one third of the tendon thickness (sagittal PDFS series 6 image 14 and axial PDFS series 2 image 14). No full-thickness tear or  muscle atrophy.    Deltoid: No strain or atrophy.    Coracoacromial arch:    Acromion morphology: The acromion has type II morphology. No discrete subacromial osseous spur or os acromiale.    Acromiohumeral space: The acromiohumeral space measures 4.7 mm at its narrowest point (osseous distance).    Coracohumeral space: The coracohumeral space is within normal limits.    Acromioclavicular joint:    Joint: Mild AC joint arthropathy with approximately 3 mm of inferior osteophytosis, which effaces the underlying supraspinatus (sagittal T2 series 7 image 8 and coronal PD series 5 image 13).    Ligaments: Coracoclavicular ligaments are intact.    Bursae:    Subacromial-subdeltoid: Mild-moderate subacromial-subdeltoid bursitis.    Subcoracoid: No convincing subcoracoid bursal thickening/bursitis.    Biceps tendon: The long head of the biceps tendon is present within the bicipital groove, but becomes slightly medially subluxated at the lesser tuberosity. The intra-articular and extra-articular segments are intact without tendinosis, tenosynovitis, or displacement.    Glenohumeral joint:    Effusion/cyst: No significant glenohumeral joint effusion.    Articular cartilage:    Humeral head: No osteochondral abnormalities.    Glenoid: No osteochondral abnormalities.    Loose bodies: No discrete intra-articular body within the joint.    Labrum: No discrete labral tear or paralabral cyst identified on this non-arthrographic study.    Inferior glenohumeral ligament/axillary pouch: Intact. The axillary pouch is normal in thickness and signal. No evidence of adhesive capsulitis or capsular injury.    IMPRESSION:    1. Nondisplaced fracture involving the anterior aspect of the greater tuberosity measuring 2.5 x 2.1 cm.    2. Mild subscapularis tendinopathy with a small area of low-grade tearing at the superior leading edge of the tendon, with minimal medial subluxation the biceps long head tendon. However, there is no biceps  tendinopathy or tear.    3. Mild supraspinatus tendinopathy, without tear.    4. Moderate narrowing of the acromiohumeral space with mild-moderate subacromial-subdeltoid bursitis. Additionally, inferior osteophytosis from mild AC joint arthropathy effaces the underlying supraspinatus.    5. No labral tear or paralabral cyst.    6. No full-thickness chondral defect or evidence of glenohumeral joint osteoarthritis.    Electronically signed on 1/10/2022 7:20:00 AM by Holger Bullard M.D.

## 2022-01-13 NOTE — PATIENT INSTRUCTIONS
You have a nondisplaced greater tuberosity fracture (part of the bone where you rotator cuff tendon attaches)    No heavy lifting, careful to avoid further injury for another month    Schedule PT at [facility]   [address] [place] [address] [id number] [phone] -- appt line     Please come back to see me in about 6 weeks, sooner with any concerns

## 2022-01-13 NOTE — NURSING NOTE
Chief Complaint   Patient presents with     Results     discussing MRI results     Pre-visit Screening:  Immunizations:  up to date  Colonoscopy:  is up to date  Mammogram: NA  Asthma Action Test/Plan:  NA  PHQ9:  NA  GAD7:  NA  Questioned patient about current smoking habits Pt. has never smoked.  Ok to leave detailed message on voice mail for today's visit only Yes, phone # [phone]

## 2022-01-14 NOTE — PROGRESS NOTES
"ASSESSMENT & PLAN    1. Left shoulder pain  2. Fall  3. Shoulder injury, left, initial encounter  Acute left shoulder injury 10 days ago, XRs obtained and reviewed with patient, demonstrating early GH DJD but concern clinically for RTC injury. Recommend MRI to further evaluate, clinic follow-up to review. Can continue ice, NSAIDs for pain prn. No heavy lifting or painful movements for now.   - XR Shoulder Left G/E 3 Views  - MR Shoulder Left w/o Contrast; Future  - Radiology Referral; Future      Nadeem Baker MD, [facility]     -----  Chief Complaint   Patient presents with     Shoulder Pain     left shoulder pain, slipped on ice on 12/26, loss of ROM, pain with lifting arm upwards, taking ibuprofen        SUBJECTIVE  Jose F Cortez is a/an 66 year old male who is seen for evaluation of left shoulder injury.     The patient is seen by themselves.  The patient is Right handed    Date of Onset: 12/26/21. Patient describes injury as direct fall onto L shoulder while playing hockey  Location of Pain: left lateral shoulder  Worsened by: overhead movement  Better with: rest  Associated symptoms: no distal numbness or tingling; denies swelling or warmth    Orthopedic/Surgical history: No prior shoulder injuries or surgeries    No family history pertinent to patient's problem today.       OBJECTIVE:  /82 (BP Location: Left arm, Patient Position: Sitting, Cuff Size: Adult Regular)   Pulse 62   Temp 97.9  F (36.6  C) (Temporal)   Ht 1.702 m (5' 7\")   Wt 79.2 kg (174 lb 9.6 oz)   SpO2 98%   BMI 27.35 kg/m     General: healthy, alert and in no distress  HEENT: no scleral icterus or conjunctival erythema  Skin: no visible suspicious lesions or rashes.   CV: no pedal edema   Resp: normal respiratory effort without conversational dyspnea   Psych: normal mood and affect  Gait: normal, with appropriate coordination and balance     MSK:   Left shoulder without acute deformity  AROM: abd " limited by pain to 80 deg/60/T6  5/5 str in abd ER/IR   Empty can painful  CMS intact distal LUE    RADIOLOGY:  EXAM: SHOULDER 3 VIEWS LEFT LOCATION: [facility] DATE/TIME: 1/6/2022 5:58 PM INDICATION: Left shoulder pain after fall. COMPARISON: None. IMPRESSION: No evidence of acute fracture or dislocation. Probable mild early inferior glenohumeral degenerative changes. Performed by: VERA Transcribed By: OLYA on: 01/06/2022 7:19 PM CST Finalized By: LALO MCNEIL M.D. on: 01/06/2022 7:55 PM CST

## 2022-01-24 ENCOUNTER — TRANSFERRED RECORDS (OUTPATIENT)
Dept: FAMILY MEDICINE | Facility: CLINIC | Age: 67
End: 2022-01-24

## 2022-02-02 ENCOUNTER — TRANSFERRED RECORDS (OUTPATIENT)
Dept: FAMILY MEDICINE | Facility: CLINIC | Age: 67
End: 2022-02-02

## 2022-02-03 ENCOUNTER — TRANSFERRED RECORDS (OUTPATIENT)
Dept: FAMILY MEDICINE | Facility: CLINIC | Age: 67
End: 2022-02-03

## 2022-02-24 ENCOUNTER — OFFICE VISIT (OUTPATIENT)
Dept: FAMILY MEDICINE | Facility: CLINIC | Age: 67
End: 2022-02-24

## 2022-02-24 VITALS
BODY MASS INDEX: 26.31 KG/M2 | RESPIRATION RATE: 20 BRPM | WEIGHT: 168 LBS | HEART RATE: 52 BPM | TEMPERATURE: 97.9 F | SYSTOLIC BLOOD PRESSURE: 130 MMHG | OXYGEN SATURATION: 98 % | DIASTOLIC BLOOD PRESSURE: 78 MMHG

## 2022-02-24 DIAGNOSIS — S49.92XD INJURY OF LEFT SHOULDER, SUBSEQUENT ENCOUNTER: ICD-10-CM

## 2022-02-24 DIAGNOSIS — S42.255D CLOSED NONDISPLACED FRACTURE OF GREATER TUBEROSITY OF LEFT HUMERUS WITH ROUTINE HEALING, SUBSEQUENT ENCOUNTER: Primary | ICD-10-CM

## 2022-02-24 PROCEDURE — 99213 OFFICE O/P EST LOW 20 MIN: CPT | Performed by: STUDENT IN AN ORGANIZED HEALTH CARE EDUCATION/TRAINING PROGRAM

## 2022-02-24 NOTE — PATIENT INSTRUCTIONS
Slow progress is expected with this injury    Continue to be diligent with your home program     Let me know what you and your PT decide to do after these next couple sessions    Follow-up with me again in approx 2 months, sooner if any concerns

## 2022-02-24 NOTE — NURSING NOTE
Chief Complaint   Patient presents with     Follow Up     follow up on left shoulder, doing much better now     Pre-visit Screening:  Immunizations:  up to date  Colonoscopy:  is up to date  Mammogram: NA  Asthma Action Test/Plan:  NA  PHQ9:  NA  GAD7:  NA  Questioned patient about current smoking habits Pt. has never smoked.  Ok to leave detailed message on voice mail for today's visit only Yes, phone # [phone]

## 2022-02-24 NOTE — PROGRESS NOTES
ASSESSMENT & PLAN    1. Closed nondisplaced fracture of greater tuberosity of left humerus with routine healing, subsequent encounter  2. Injury of left shoulder, subsequent encounter  Slow progress not unexpected with this injury, recommend continuing PT. Will discuss further with pt's PT as well.  S/sx of frozen shoulder reviewed, slight risk of developing.     Patient Instructions   Slow progress is expected with this injury    Continue to be diligent with your home program     Let me know what you and your PT decide to do after these next couple sessions    Follow-up with me again in approx 2 months, sooner if any concerns    Nadeem Baker MD, [facility]  Primary Care Sports Medicine   -----    SUBJECTIVE:  Jose F Cortez is a 66 year old male who is seen in follow-up for   Chief Complaint   Patient presents with     Follow Up     follow up on left shoulder, doing much better now     Seeing PT and daily HEP. Seeing small improvements, tolerating progression of exercises. No new injury, no regression or loss of motion. No new sx or concerns.     The patient is seen by themselves.    Patient's past medical, surgical, social, and family histories were reviewed today and no changes are noted.      OBJECTIVE:  /78 (BP Location: Left arm, Patient Position: Sitting, Cuff Size: Adult Large)   Pulse 52   Temp 97.9  F (36.6  C) (Temporal)   Resp 20   Wt 76.2 kg (168 lb)   SpO2 98%   BMI 26.31 kg/m     General: healthy, alert and in no distress  HEENT: no scleral icterus or conjunctival erythema  Skin: no suspicious lesions or rash.   Resp: normal respiratory effort without conversational dyspnea   Psych: normal mood and affect  Gait: normal steady gait     MSK:  L Shoulder AROM abd 100 with pain, ER 70 (contralat 160 and 80)  5/5 str in abd, ER/IR  Pain with empty can but str intact  CMS intact distally    Imaging:  No new imaging today

## 2022-04-01 DIAGNOSIS — J30.1 ACUTE SEASONAL ALLERGIC RHINITIS DUE TO POLLEN: ICD-10-CM

## 2022-04-01 RX ORDER — OLOPATADINE HYDROCHLORIDE 1 MG/ML
1 SOLUTION/ DROPS OPHTHALMIC 2 TIMES DAILY
Qty: 5 ML | Refills: 3 | COMMUNITY
Start: 2022-04-01

## 2022-04-01 NOTE — TELEPHONE ENCOUNTER
Jose F Cortez is requesting a refill of:    Refused Prescriptions:                       Disp   Refills    olopatadine (PATANOL) 0.1 % ophthalmic sol*5 mL   3        Sig: Place 1 drop into both eyes 2 times daily  Refused By: BIBIANA CHEATHAM  Reason for Refusal: Should already have refills on file

## 2022-04-07 DIAGNOSIS — J30.1 ACUTE SEASONAL ALLERGIC RHINITIS DUE TO POLLEN: ICD-10-CM

## 2022-04-08 RX ORDER — OLOPATADINE HYDROCHLORIDE 1 MG/ML
SOLUTION/ DROPS OPHTHALMIC
Qty: 5 ML | Refills: 2 | Status: SHIPPED | OUTPATIENT
Start: 2022-04-08 | End: 2022-07-20

## 2022-04-08 NOTE — TELEPHONE ENCOUNTER
Pt last seen for med recheck 07/12/21 advised to return in 1 year.    Jose F Cotrez is requesting a refill of:    Pending Prescriptions:                       Disp   Refills    olopatadine (PATANOL) 0.1 % ophthalmic so*5 mL   2            Sig: INSTILL 1 DROP IN BOTH EYES TWICE DAILY

## 2022-04-13 DIAGNOSIS — J30.1 ACUTE SEASONAL ALLERGIC RHINITIS DUE TO POLLEN: ICD-10-CM

## 2022-04-13 RX ORDER — MONTELUKAST SODIUM 10 MG/1
TABLET ORAL
Qty: 90 TABLET | Refills: 0 | Status: SHIPPED | OUTPATIENT
Start: 2022-04-13 | End: 2024-01-05

## 2022-04-13 NOTE — TELEPHONE ENCOUNTER
Jose F Cortez is requesting a refill of:    Pending Prescriptions:                       Disp   Refills    montelukast (SINGULAIR) 10 MG tablet [Pha*90 tab*0            Sig: TAKE 1 TABLET(10 MG) BY MOUTH DAILY    Will need OV for refills

## 2022-04-25 ENCOUNTER — OFFICE VISIT (OUTPATIENT)
Dept: FAMILY MEDICINE | Facility: CLINIC | Age: 67
End: 2022-04-25

## 2022-04-25 VITALS
RESPIRATION RATE: 20 BRPM | OXYGEN SATURATION: 98 % | TEMPERATURE: 97.9 F | DIASTOLIC BLOOD PRESSURE: 88 MMHG | HEART RATE: 62 BPM | SYSTOLIC BLOOD PRESSURE: 130 MMHG | BODY MASS INDEX: 26 KG/M2 | WEIGHT: 166 LBS

## 2022-04-25 DIAGNOSIS — S42.255D CLOSED NONDISPLACED FRACTURE OF GREATER TUBEROSITY OF LEFT HUMERUS WITH ROUTINE HEALING, SUBSEQUENT ENCOUNTER: Primary | ICD-10-CM

## 2022-04-25 DIAGNOSIS — S49.92XD INJURY OF LEFT SHOULDER, SUBSEQUENT ENCOUNTER: ICD-10-CM

## 2022-04-25 PROCEDURE — 99213 OFFICE O/P EST LOW 20 MIN: CPT | Performed by: STUDENT IN AN ORGANIZED HEALTH CARE EDUCATION/TRAINING PROGRAM

## 2022-04-25 NOTE — PROGRESS NOTES
ASSESSMENT & PLAN      ICD-10-CM    1. Closed nondisplaced fracture of greater tuberosity of left humerus with routine healing, subsequent encounter  S42.255D    2. Injury of left shoulder, subsequent encounter  S49.92XD       Continued gradual progress, doing well and encouraged continued therapy participation.    Patient Instructions   Keep up with PT and home exercises     Follow-up with me in 3-4 months if you're not getting close to 100%. Let me know sooner if any concerns.       Nadeem Baker MD, [facility]  Primary Care Sports Medicine   -----    SUBJECTIVE:  Jose F Cortez is a 66 year old male who is seen in follow-up for   Chief Complaint   Patient presents with     Follow Up     Follow up on left shoulder, has been doing PT and still is, working well, feels like the pain has become much better with the progress of PT      They were last seen 2/24/2022.     Reports continued positive progress, doing well in PT. No new injuries or setbacks.     The patient is seen by themselves.    Patient's past medical, surgical, social, and family histories were reviewed today and no changes are noted.    REVIEW OF SYSTEMS:  Constitutional: NEGATIVE for fever, chills, change in weight  Skin: NEGATIVE for worrisome rashes, moles or lesions  GI/: NEGATIVE for bowel or bladder changes  Neuro: NEGATIVE for weakness, dizziness or paresthesias    OBJECTIVE:  /88 (BP Location: Right arm, Patient Position: Sitting, Cuff Size: Adult Large)   Pulse 62   Temp 97.9  F (36.6  C) (Temporal)   Resp 20   Wt 75.3 kg (166 lb)   SpO2 98%   BMI 26.00 kg/m     General: healthy, alert and in no distress  HEENT: no scleral icterus or conjunctival erythema  Skin: no suspicious lesions or rash.   Resp: normal respiratory effort without conversational dyspnea   Psych: normal mood and affect  Gait: normal steady gait     MSK:  L Shoulder AROM abd 130 withmild pain, ER 70 (contralat 160 and 80)  5/5 str in abd, ER/IR  Pain with empty  can but str intact, improved  CMS intact distally    Imaging:  No new imaging today

## 2022-04-25 NOTE — NURSING NOTE
Chief Complaint   Patient presents with     Follow Up     Follow up on left shoulder, has been doing PT and still is, working well, feels like the pain has become much better with the progress of PT     Pre-visit Screening:  Immunizations:  up to date  Colonoscopy:  is up to date  Mammogram: NA  Asthma Action Test/Plan:  NA  PHQ9:  NA  GAD7:  NA  Questioned patient about current smoking habits Pt. has never smoked.  Ok to leave detailed message on voice mail for today's visit only Yes, phone # [phone]

## 2022-04-25 NOTE — PATIENT INSTRUCTIONS
Keep up with PT and home exercises     Follow-up with me in 3-4 months if you're not getting close to 100%. Let me know sooner if any concerns.

## 2022-06-04 ENCOUNTER — HEALTH MAINTENANCE LETTER (OUTPATIENT)
Age: 67
End: 2022-06-04

## 2022-06-12 ENCOUNTER — MYC REFILL (OUTPATIENT)
Dept: FAMILY MEDICINE | Facility: CLINIC | Age: 67
End: 2022-06-12

## 2022-06-12 DIAGNOSIS — L30.9 DERMATITIS: ICD-10-CM

## 2022-06-13 RX ORDER — MOMETASONE FUROATE 1 MG/G
CREAM TOPICAL DAILY PRN
Qty: 45 G | Refills: 0 | Status: SHIPPED | OUTPATIENT
Start: 2022-06-13 | End: 2023-06-05

## 2022-06-13 NOTE — TELEPHONE ENCOUNTER
Please advise. Pt sent refill request for Mometasone. Last prescribed 2018.     Jose F Cortez is requesting a refill of:    Pending Prescriptions:                       Disp   Refills    mometasone (ELOCON) 0.1 % external cream  45 g   0            Sig: Apply topically 2 times daily as needed Apply           sparingly to affected area.  Do not apply to           face.

## 2022-07-09 DIAGNOSIS — J30.1 ACUTE SEASONAL ALLERGIC RHINITIS DUE TO POLLEN: ICD-10-CM

## 2022-07-10 RX ORDER — MONTELUKAST SODIUM 10 MG/1
TABLET ORAL
Qty: 90 TABLET | Refills: 0 | COMMUNITY
Start: 2022-07-10

## 2022-07-10 NOTE — TELEPHONE ENCOUNTER
Received incoming refill request for  Pending Prescriptions:                       Disp   Refills    montelukast (SINGULAIR) 10 MG tablet [Pha*90 tab*0            Sig: TAKE 1 TABLET(10 MG) BY MOUTH DAILY    Patient last had a refill of this medication on 6/13/22 so no refill should be needed at this time.

## 2022-07-18 DIAGNOSIS — J30.1 ACUTE SEASONAL ALLERGIC RHINITIS DUE TO POLLEN: ICD-10-CM

## 2022-07-20 RX ORDER — OLOPATADINE HYDROCHLORIDE 1 MG/ML
SOLUTION/ DROPS OPHTHALMIC
Qty: 5 ML | Refills: 0 | Status: SHIPPED | OUTPATIENT
Start: 2022-07-20 | End: 2022-09-26

## 2022-07-20 NOTE — TELEPHONE ENCOUNTER
Jose F Cortez is requesting a refill of:    Pending Prescriptions:                       Disp   Refills    olopatadine (PATANOL) 0.1 % ophthalmic so*5 mL   0            Sig: INSTILL 1 DROP IN BOTH EYES TWICE DAILY    Needs OV for refills

## 2022-08-12 ENCOUNTER — TRANSFERRED RECORDS (OUTPATIENT)
Dept: FAMILY MEDICINE | Facility: CLINIC | Age: 67
End: 2022-08-12

## 2022-08-25 DIAGNOSIS — N52.9 ERECTILE DYSFUNCTION, UNSPECIFIED ERECTILE DYSFUNCTION TYPE: Primary | ICD-10-CM

## 2022-08-25 DIAGNOSIS — Z80.42 FAMILY HISTORY OF MALIGNANT NEOPLASM OF PROSTATE: ICD-10-CM

## 2022-08-25 PROCEDURE — 84403 ASSAY OF TOTAL TESTOSTERONE: CPT | Mod: 90 | Performed by: FAMILY MEDICINE

## 2022-08-25 PROCEDURE — 36415 COLL VENOUS BLD VENIPUNCTURE: CPT | Performed by: FAMILY MEDICINE

## 2022-08-25 PROCEDURE — 84153 ASSAY OF PSA TOTAL: CPT | Mod: 90 | Performed by: FAMILY MEDICINE

## 2022-08-25 PROCEDURE — 83002 ASSAY OF GONADOTROPIN (LH): CPT | Mod: 90 | Performed by: FAMILY MEDICINE

## 2022-08-26 LAB — LH, SERUM: 11.8 MIU/ML (ref 1.6–15.2)

## 2022-08-27 LAB — TESTOSTERONE, TOTAL, LC/MS/MS-QUEST: 685 NG/DL (ref 250–1100)

## 2022-09-09 LAB — ABBOTT PSA - QUEST: 0.98 NG/ML

## 2022-09-22 ENCOUNTER — LAB (OUTPATIENT)
Dept: LAB | Facility: CLINIC | Age: 67
End: 2022-09-22
Payer: COMMERCIAL

## 2022-09-22 DIAGNOSIS — N52.9 PRIMARY ERECTILE DYSFUNCTION: Primary | ICD-10-CM

## 2022-09-22 PROCEDURE — 84270 ASSAY OF SEX HORMONE GLOBUL: CPT

## 2022-09-22 PROCEDURE — 36415 COLL VENOUS BLD VENIPUNCTURE: CPT

## 2022-09-23 LAB — SHBG SERPL-SCNC: 69 NMOL/L (ref 11–80)

## 2022-09-25 DIAGNOSIS — J30.1 ACUTE SEASONAL ALLERGIC RHINITIS DUE TO POLLEN: ICD-10-CM

## 2022-09-26 RX ORDER — OLOPATADINE HYDROCHLORIDE 1 MG/ML
SOLUTION/ DROPS OPHTHALMIC
Qty: 5 ML | Refills: 0 | Status: SHIPPED | OUTPATIENT
Start: 2022-09-26 | End: 2022-12-07

## 2022-10-09 ENCOUNTER — HEALTH MAINTENANCE LETTER (OUTPATIENT)
Age: 67
End: 2022-10-09

## 2022-10-13 ENCOUNTER — TRANSFERRED RECORDS (OUTPATIENT)
Dept: FAMILY MEDICINE | Facility: CLINIC | Age: 67
End: 2022-10-13

## 2022-10-20 ENCOUNTER — ALLIED HEALTH/NURSE VISIT (OUTPATIENT)
Dept: FAMILY MEDICINE | Facility: CLINIC | Age: 67
End: 2022-10-20

## 2022-10-20 DIAGNOSIS — Z23 NEED FOR VACCINATION: Primary | ICD-10-CM

## 2022-10-20 PROCEDURE — 90662 IIV NO PRSV INCREASED AG IM: CPT | Performed by: FAMILY MEDICINE

## 2022-10-20 PROCEDURE — 90471 IMMUNIZATION ADMIN: CPT | Performed by: FAMILY MEDICINE

## 2022-11-21 DIAGNOSIS — J30.1 ACUTE SEASONAL ALLERGIC RHINITIS DUE TO POLLEN: ICD-10-CM

## 2022-11-22 RX ORDER — OLOPATADINE HYDROCHLORIDE 1 MG/ML
SOLUTION/ DROPS OPHTHALMIC
Qty: 5 ML | Refills: 0 | COMMUNITY
Start: 2022-11-22

## 2022-11-22 NOTE — TELEPHONE ENCOUNTER
Jose F Cortez is requesting a refill of:    Refused Prescriptions:                       Disp   Refills    olopatadine (PATANOL) 0.1 % ophthalmic sol*5 mL   0        Sig: INSTILL 1 DROP IN BOTH EYES TWICE DAILY    Needs OV for refills

## 2022-12-07 ENCOUNTER — OFFICE VISIT (OUTPATIENT)
Dept: FAMILY MEDICINE | Facility: CLINIC | Age: 67
End: 2022-12-07

## 2022-12-07 DIAGNOSIS — U07.1 INFECTION DUE TO 2019 NOVEL CORONAVIRUS: Primary | ICD-10-CM

## 2022-12-07 DIAGNOSIS — J30.1 ACUTE SEASONAL ALLERGIC RHINITIS DUE TO POLLEN: ICD-10-CM

## 2022-12-07 PROCEDURE — 99213 OFFICE O/P EST LOW 20 MIN: CPT | Mod: GT | Performed by: FAMILY MEDICINE

## 2022-12-07 RX ORDER — OLOPATADINE HYDROCHLORIDE 1 MG/ML
SOLUTION/ DROPS OPHTHALMIC
Qty: 5 ML | Refills: 0 | Status: SHIPPED | OUTPATIENT
Start: 2022-12-07 | End: 2023-03-15

## 2022-12-07 NOTE — PROGRESS NOTES
" This visit is being conducted as a virtual visit due to the emphasis on mitigation of the COVID-19 virus pandemic. The clinician has decided that the risk of an in-office visit outweighs the benefit for this patient.      The patient has been notified of following:   \"This telemed visit will be conducted via a virtual communication between you and your physician/provider using [employer]-pt consents verbally to telemedicine visit. .This visit is done via synchronous audio/visual communication. We have found that certain health care needs can be provided without the need for a physical exam.  This service lets us provide the care you need with a virtual visit  If a prescription is necessary we can send it directly to your pharmacy.  If lab work is needed we can place an order for that and you can then stop by our lab to have the test done at a later time.    This visit takes place with the patient at home and provider in clinic    Pt did home test 3 days ago positive covid    Pt needs a letter to return to work-he feels well now    SUBJECTIVE:   Jose F Cortez is a 67 year old male who complains of headache, dry cough, myalgias, chills and fatigue for 6 days. He denies a history of productive cough, shortness of breath and chest pain and denies a history of asthma. Patient does not smoke cigarettes.     OBJECTIVE:  Gen- alert, NAD, cheerful, mentation , judgement and insight intact.  Well groomed.      ASSESSMENT:   covid 19, outside of 5 day quarantine now    PLAN:  Symptomatic therapy suggested: push fluids and rest. Call or return to clinic prn if these symptoms worsen or fail to improve as anticipated.     Letter written for work   "

## 2022-12-07 NOTE — LETTER
[facility]  [address], [address]  [place]  [address]    December 7, 2022        RE: Jose F Cortez  [address] [address] [facility] [id number]              To Whom It May Concern,     Jose F was evaluated here today for a recent infection.  He tested positive for covid-19 on  12/5/22.  His symptoms began 12/2/22.  He is now asymptomatic and outside of the quarantine period and may return to work without restrictions 12/8/22         If you have any further questions or problems, please contact our office at [phone].          Diego Niño MD

## 2022-12-07 NOTE — NURSING NOTE
Needs note for work. Tested positive for COVID.  Sx started Friday, tested Monday and was positive.

## 2023-02-16 ENCOUNTER — TRANSFERRED RECORDS (OUTPATIENT)
Dept: FAMILY MEDICINE | Facility: CLINIC | Age: 68
End: 2023-02-16

## 2023-03-14 DIAGNOSIS — J30.1 ACUTE SEASONAL ALLERGIC RHINITIS DUE TO POLLEN: ICD-10-CM

## 2023-03-14 NOTE — TELEPHONE ENCOUNTER
Patient called into the CS line asking for a refill of  Pending Prescriptions:                       Disp   Refills    olopatadine (PATANOL) 0.1 % ophthalmic so*5 mL   0            Sig: INSTILL 1 DROP IN BOTH EYES TWICE DAILY Strength:           0.1 %    He did schedule an OV on 5/1/23 with [facility], routing to Dr Niño for approval or denial of request.

## 2023-03-15 RX ORDER — OLOPATADINE HYDROCHLORIDE 1 MG/ML
SOLUTION/ DROPS OPHTHALMIC
Qty: 5 ML | Refills: 0 | Status: SHIPPED | OUTPATIENT
Start: 2023-03-15 | End: 2023-05-01

## 2023-05-01 ENCOUNTER — OFFICE VISIT (OUTPATIENT)
Dept: FAMILY MEDICINE | Facility: CLINIC | Age: 68
End: 2023-05-01

## 2023-05-01 VITALS
WEIGHT: 167.8 LBS | RESPIRATION RATE: 20 BRPM | BODY MASS INDEX: 26.34 KG/M2 | SYSTOLIC BLOOD PRESSURE: 134 MMHG | HEART RATE: 64 BPM | TEMPERATURE: 97.1 F | HEIGHT: 67 IN | DIASTOLIC BLOOD PRESSURE: 74 MMHG

## 2023-05-01 DIAGNOSIS — J30.1 ACUTE SEASONAL ALLERGIC RHINITIS DUE TO POLLEN: Primary | ICD-10-CM

## 2023-05-01 DIAGNOSIS — N52.9 ERECTILE DYSFUNCTION, UNSPECIFIED ERECTILE DYSFUNCTION TYPE: ICD-10-CM

## 2023-05-01 PROCEDURE — 99213 OFFICE O/P EST LOW 20 MIN: CPT | Performed by: FAMILY MEDICINE

## 2023-05-01 RX ORDER — OLOPATADINE HYDROCHLORIDE 1 MG/ML
SOLUTION/ DROPS OPHTHALMIC
Qty: 5 ML | Refills: 3 | Status: SHIPPED | OUTPATIENT
Start: 2023-05-01 | End: 2023-08-18

## 2023-05-01 RX ORDER — TADALAFIL 5 MG/1
5 TABLET ORAL EVERY 24 HOURS
COMMUNITY
Start: 2023-05-01

## 2023-05-01 NOTE — PROGRESS NOTES
SUBJECTIVE:    Jose F Cortez is a 67 year old male who presents for evaluation and treatment of allergic symptoms. Symptoms include itchy eyes and watery eyes and are not present in a seasonal pattern. Precipitants include dust mites.    Previous workup included : Previous testing  Known allergies: Dust mites and Tree pollens    Treatment in the past has included Zyrtec and Patanol. Treatment currently includes Zyrtec and Patanol and is effective in the patient's opinion. Pt no longer taking Singulair      OBJECTIVE:  General: alert, NAD  Eyes: normal  Ears: negative  Nose: normal  O/P: mild erythema  Lungs: clear to ascultation all fields    ASSESSMENT: allergic rhinitis, allergic conjunctivitis      PLAN:    No changes in the current treatment plan,   Followup in 1 years if not improved or sooner if symptoms worsen.        --------------------------------------------------------------------------------

## 2023-05-01 NOTE — NURSING NOTE
Jose F TING Cortez is here for a medication check and refill for his allergy eye drops.    Questioned patient about current smoking habits.  Pt. has never smoked.  PULSE regular  My Chart: active  CLASSIFICATION OF OVERWEIGHT AND OBESITY BY BMI                        Obesity Class           BMI(kg/m2)  Underweight                                    < 18.5  Normal                                         18.5-24.9  Overweight                                     25.0-29.9  OBESITY                     I                  30.0-34.9                             II                 35.0-39.9  EXTREME OBESITY             III                >40                            Patient's  BMI Body mass index is 26.28 kg/m .  http://hin.nhlbi.nih.gov/menuplanner/menu.cgi  Pre-visit planning  Immunizations - Had Tdap as an allergy  Colonoscopy - is up to date  Mammogram -   Asthma -   PHQ9 -    REFUGIO-7 -      The patient has verbalized that it is ok to leave a detailed voice message on the patient's cell phone with results/recommendations from this visit.

## 2023-06-04 ENCOUNTER — MYC MEDICAL ADVICE (OUTPATIENT)
Dept: FAMILY MEDICINE | Facility: CLINIC | Age: 68
End: 2023-06-04

## 2023-06-04 DIAGNOSIS — L30.9 DERMATITIS: ICD-10-CM

## 2023-06-05 RX ORDER — MOMETASONE FUROATE 1 MG/G
CREAM TOPICAL DAILY PRN
Qty: 45 G | Refills: 0 | Status: SHIPPED | OUTPATIENT
Start: 2023-06-05 | End: 2024-01-05

## 2023-06-05 NOTE — TELEPHONE ENCOUNTER
Pt sent mychart asking for refill of mometasone.    Jose F Cortez is requesting a refill of:    Pending Prescriptions:                       Disp   Refills    mometasone (ELOCON) 0.1 % external cream  45 g   0            Sig: Apply topically daily as needed Apply sparingly           to affected area.  Do not apply to face.

## 2023-06-10 ENCOUNTER — HEALTH MAINTENANCE LETTER (OUTPATIENT)
Age: 68
End: 2023-06-10

## 2023-08-08 ENCOUNTER — TRANSFERRED RECORDS (OUTPATIENT)
Dept: FAMILY MEDICINE | Facility: CLINIC | Age: 68
End: 2023-08-08

## 2023-08-18 DIAGNOSIS — J30.1 ACUTE SEASONAL ALLERGIC RHINITIS DUE TO POLLEN: ICD-10-CM

## 2023-08-18 RX ORDER — OLOPATADINE HYDROCHLORIDE 1 MG/ML
SOLUTION/ DROPS OPHTHALMIC
Qty: 5 ML | Refills: 3 | Status: SHIPPED | OUTPATIENT
Start: 2023-08-18 | End: 2024-01-05

## 2023-08-18 NOTE — TELEPHONE ENCOUNTER
Jose F Cortez is requesting a refill of:    Pending Prescriptions:                       Disp   Refills    olopatadine (PATANOL) 0.1 % ophthalmic so*5 mL   3            Sig: PLACE 1 DROP INTO BOTH EYES TWICE DAILY    Please close encounter if RX was sent. Thanks, Fadia

## 2023-09-26 ENCOUNTER — ALLIED HEALTH/NURSE VISIT (OUTPATIENT)
Dept: FAMILY MEDICINE | Facility: CLINIC | Age: 68
End: 2023-09-26

## 2023-09-26 DIAGNOSIS — Z23 NEED FOR VACCINATION: Primary | ICD-10-CM

## 2023-09-26 PROCEDURE — 90662 IIV NO PRSV INCREASED AG IM: CPT | Performed by: FAMILY MEDICINE

## 2023-09-26 PROCEDURE — 90471 IMMUNIZATION ADMIN: CPT | Performed by: FAMILY MEDICINE

## 2023-12-30 DIAGNOSIS — L30.9 DERMATITIS: ICD-10-CM

## 2023-12-30 DIAGNOSIS — J30.1 ACUTE SEASONAL ALLERGIC RHINITIS DUE TO POLLEN: ICD-10-CM

## 2024-01-02 RX ORDER — OLOPATADINE HYDROCHLORIDE 1 MG/ML
SOLUTION/ DROPS OPHTHALMIC
COMMUNITY
Start: 2024-01-02

## 2024-01-02 NOTE — TELEPHONE ENCOUNTER
Received incoming refill request for  Pending Prescriptions:                       Disp   Refills    olopatadine (PATANOL) 0.1 % ophthalmic so*15 mL               Sig: INSTILL 1 DROP IN BOTH EYES TWICE DAILY    Patient should still have a refill left.

## 2024-01-05 RX ORDER — MOMETASONE FUROATE 1 MG/G
CREAM TOPICAL DAILY PRN
Qty: 45 G | Refills: 0 | Status: SHIPPED | OUTPATIENT
Start: 2024-01-05

## 2024-01-05 RX ORDER — OLOPATADINE HYDROCHLORIDE 1 MG/ML
SOLUTION/ DROPS OPHTHALMIC
Qty: 5 ML | Refills: 3 | Status: SHIPPED | OUTPATIENT
Start: 2024-01-05 | End: 2024-06-12

## 2024-01-05 RX ORDER — MONTELUKAST SODIUM 10 MG/1
10 TABLET ORAL DAILY
Qty: 90 TABLET | Refills: 0 | Status: SHIPPED | OUTPATIENT
Start: 2024-01-05 | End: 2024-06-12

## 2024-01-05 NOTE — TELEPHONE ENCOUNTER
Patient called into the pharmacy stating that he wants his medications resent to [facility] in [place] because he has not been able to get his medication at the [facility].     Routing to Sunni to send in due to Dr. Niño being out of office.

## 2024-05-07 DIAGNOSIS — J30.1 ACUTE SEASONAL ALLERGIC RHINITIS DUE TO POLLEN: ICD-10-CM

## 2024-05-08 ENCOUNTER — MYC MEDICAL ADVICE (OUTPATIENT)
Dept: FAMILY MEDICINE | Facility: CLINIC | Age: 69
End: 2024-05-08

## 2024-05-08 DIAGNOSIS — J30.1 ACUTE SEASONAL ALLERGIC RHINITIS DUE TO POLLEN: ICD-10-CM

## 2024-05-08 RX ORDER — OLOPATADINE HYDROCHLORIDE 1 MG/ML
SOLUTION/ DROPS OPHTHALMIC
COMMUNITY
Start: 2024-05-08

## 2024-05-08 NOTE — TELEPHONE ENCOUNTER
Jose F Cortez is requesting a refill of:    Refused Prescriptions:                       Disp   Refills    olopatadine (PATANOL) 0.1 % ophthalmic sol*                Sig: PLACE 1 DROP INTO BOTH EYES TWICE DAILY.  Refused By: BIBIANA CHEATHAM  Reason for Refusal: Patient needs appointment    Needs OV for refills

## 2024-06-06 DIAGNOSIS — J30.1 ACUTE SEASONAL ALLERGIC RHINITIS DUE TO POLLEN: ICD-10-CM

## 2024-06-06 RX ORDER — OLOPATADINE HYDROCHLORIDE 1 MG/ML
SOLUTION/ DROPS OPHTHALMIC
COMMUNITY
Start: 2024-06-06

## 2024-06-12 RX ORDER — OLOPATADINE HYDROCHLORIDE 1 MG/ML
SOLUTION/ DROPS OPHTHALMIC
Qty: 5 ML | Refills: 0 | Status: SHIPPED | OUTPATIENT
Start: 2024-06-12 | End: 2024-07-10

## 2024-06-12 RX ORDER — MONTELUKAST SODIUM 10 MG/1
10 TABLET ORAL DAILY
Qty: 30 TABLET | Refills: 0 | Status: SHIPPED | OUTPATIENT
Start: 2024-06-12 | End: 2024-07-10

## 2024-06-12 NOTE — TELEPHONE ENCOUNTER
Patient is scheduled for 7/1 but needs extension of    Pending Prescriptions:                       Disp   Refills    montelukast (SINGULAIR) 10 MG tablet      30 tab*0            Sig: Take 1 tablet (10 mg) by mouth daily    olopatadine (PATANOL) 0.1 % ophthalmic so*5 mL   0            Sig: PLACE 1 DROP INTO BOTH EYES TWICE DAILY    Routing to Dr. Niño for approval.

## 2024-07-10 ENCOUNTER — OFFICE VISIT (OUTPATIENT)
Dept: FAMILY MEDICINE | Facility: CLINIC | Age: 69
End: 2024-07-10

## 2024-07-10 VITALS
BODY MASS INDEX: 26.16 KG/M2 | TEMPERATURE: 97.7 F | HEART RATE: 63 BPM | OXYGEN SATURATION: 96 % | SYSTOLIC BLOOD PRESSURE: 128 MMHG | WEIGHT: 167 LBS | DIASTOLIC BLOOD PRESSURE: 82 MMHG

## 2024-07-10 DIAGNOSIS — M77.42 METATARSALGIA OF BOTH FEET: ICD-10-CM

## 2024-07-10 DIAGNOSIS — J30.1 ACUTE SEASONAL ALLERGIC RHINITIS DUE TO POLLEN: ICD-10-CM

## 2024-07-10 DIAGNOSIS — S76.311A HAMSTRING MUSCLE STRAIN, RIGHT, INITIAL ENCOUNTER: Primary | ICD-10-CM

## 2024-07-10 DIAGNOSIS — M77.41 METATARSALGIA OF BOTH FEET: ICD-10-CM

## 2024-07-10 PROCEDURE — 99213 OFFICE O/P EST LOW 20 MIN: CPT | Performed by: PHYSICIAN ASSISTANT

## 2024-07-10 RX ORDER — CELECOXIB 200 MG/1
200 CAPSULE ORAL DAILY
Qty: 90 CAPSULE | Refills: 0 | Status: SHIPPED | OUTPATIENT
Start: 2024-07-10

## 2024-07-10 RX ORDER — OLOPATADINE HYDROCHLORIDE 1 MG/ML
SOLUTION/ DROPS OPHTHALMIC
Qty: 5 ML | Refills: 5 | Status: SHIPPED | OUTPATIENT
Start: 2024-07-10

## 2024-07-10 RX ORDER — MONTELUKAST SODIUM 10 MG/1
10 TABLET ORAL DAILY
Qty: 90 TABLET | Refills: 3 | Status: SHIPPED | OUTPATIENT
Start: 2024-07-10

## 2024-07-10 NOTE — NURSING NOTE
Chief Complaint   Patient presents with    Pain      Pulled hamstring in right leg, bruised and painful, he would like to PT     Recheck Medication     Refill medications     Pre-visit Screening:  Immunizations:  not up to date - tdap at pharmacy  Colonoscopy:  is up to date  Mammogram: NA  Asthma Action Test/Plan:  NA  PHQ9:  NA  GAD7:  NA  Questioned patient about current smoking habits Pt. has never smoked.  Ok to leave detailed message on voice mail for today's visit only Yes, phone # [phone]

## 2024-07-10 NOTE — PROGRESS NOTES
Assessment & Plan     Metatarsalgia of both feet-stable, pt using rarely and appropriately    - celecoxib (CELEBREX) 200 MG capsule  Dispense: 90 capsule; Refill: 0    Acute seasonal allergic rhinitis due to pollen  Stable, pt doing well on this regimen  - olopatadine (PATANOL) 0.1 % ophthalmic solution  Dispense: 5 mL; Refill: 5  - montelukast (SINGULAIR) 10 MG tablet  Dispense: 90 tablet; Refill: 3    Hamstring muscle strain, right, initial encounter  Concern for partial tear, will refer to PT for management  - Physical Therapy  Referral - To a [facility] Location (Use POS/Location)              Follow up as needed    No follow-ups on file.    Subjective   Jose F is a 68 year old, presenting for the following health issues:  Pain ( Pulled hamstring in right leg, bruised and painful, he would like to PT ) and Recheck Medication (Refill medications)    HPI     Pt hurt R hamstring in April, mild strain. Then, 1 week ago hurt the leg further waterskiing. Did the splits trying to get above the water, immediate pain in the R hamstring. Bruising and pain over the area. Pain is manageable but sitting in some positions can be unpleasant. Has had hamstring issues in the past, requests PT referral.     Requests refill Celebrex-used in the past for plantar fasciitis. Using right now for hamstring issues.     Needs refill Patanol eyedrops for allergies. Also uses Singulair.     Review of Systems  Constitutional, neuro, ENT, endocrine, pulmonary, cardiac, gastrointestinal, genitourinary, musculoskeletal, integument and psychiatric systems are negative, except as otherwise noted.      Objective    /82 (BP Location: Right arm, Patient Position: Sitting, Cuff Size: Adult Large)   Pulse 63   Temp 97.7  F (36.5  C) (Temporal)   Wt 75.8 kg (167 lb)   SpO2 96%   BMI 26.16 kg/m    Body mass index is 26.16 kg/m .  Physical Exam   GENERAL: alert and no distress  NECK: no adenopathy, no asymmetry, masses,  or scars  RESP: lungs clear to auscultation - no rales, rhonchi or wheezes  CV: regular rate and rhythm, normal S1 S2, no S3 or S4, no murmur, click or rub, no peripheral edema  ABDOMEN: soft, nontender, no hepatosplenomegaly, no masses and bowel sounds normal  MS: no gross musculoskeletal defects noted, no edema  NEURO: Normal strength and tone, mentation intact and speech normal  R leg: extensive brusing over hamstring, no pain on palpation            Signed Electronically by: Tae Zuñiga PA-C

## 2024-08-03 ENCOUNTER — HEALTH MAINTENANCE LETTER (OUTPATIENT)
Age: 69
End: 2024-08-03

## 2024-09-14 ENCOUNTER — OFFICE VISIT (OUTPATIENT)
Dept: URGENT CARE | Facility: URGENT CARE | Age: 69
End: 2024-09-14
Payer: COMMERCIAL

## 2024-09-14 VITALS
BODY MASS INDEX: 26.39 KG/M2 | HEART RATE: 64 BPM | DIASTOLIC BLOOD PRESSURE: 86 MMHG | OXYGEN SATURATION: 98 % | WEIGHT: 168.5 LBS | SYSTOLIC BLOOD PRESSURE: 179 MMHG | TEMPERATURE: 96.3 F

## 2024-09-14 DIAGNOSIS — S05.01XA ABRASION OF RIGHT CORNEA, INITIAL ENCOUNTER: Primary | ICD-10-CM

## 2024-09-14 PROCEDURE — 99213 OFFICE O/P EST LOW 20 MIN: CPT | Performed by: PHYSICIAN ASSISTANT

## 2024-09-14 RX ORDER — ERYTHROMYCIN 5 MG/G
0.5 OINTMENT OPHTHALMIC 3 TIMES DAILY
Qty: 3.5 G | Refills: 0 | Status: SHIPPED | OUTPATIENT
Start: 2024-09-14

## 2024-09-14 NOTE — PROGRESS NOTES
SUBJECTIVE:  Jose F Cortez is a 69 year old male who comes in with concerns for foreign body in his right eye.  Patient states that he feels like there is something stuck in his eye.  Does not remember anything in it but does have a job in which something possibly could flying to it also has seasonal allergies.  Has had some redness and irritation in the eye.  Vision not significantly affected.  Does not wear contacts.  No symptoms at this time.    Past Medical History:   Diagnosis Date    Allergic rhinitis, cause unspecified     testing last year-trees    Fam hx-cardiovas dis NEC      Patient Active Problem List   Diagnosis    Allergic rhinitis    Viral warts    ACP (advance care planning)    Family history of malignant neoplasm of prostate    Metatarsalgia of both feet     Current Outpatient Medications   Medication Sig Dispense Refill    celecoxib (CELEBREX) 200 MG capsule Take 1 capsule (200 mg) by mouth daily 90 capsule 0    loratadine (CLARITIN) 10 MG tablet Take 10 mg by mouth daily.      montelukast (SINGULAIR) 10 MG tablet Take 1 tablet (10 mg) by mouth daily 90 tablet 3    olopatadine (PATANOL) 0.1 % ophthalmic solution PLACE 1 DROP INTO BOTH EYES TWICE DAILY 5 mL 5    tadalafil (CIALIS) 5 MG tablet Take 1 tablet (5 mg) by mouth every 24 hours      mometasone (ELOCON) 0.1 % external cream Apply topically daily as needed Apply sparingly to affected area.  Do not apply to face. (Patient not taking: Reported on 9/14/2024) 45 g 0     No current facility-administered medications for this visit.     Social History     Socioeconomic History    Marital status:      Spouse name: Peng    Number of children: 1    Years of education: 14    Highest education level: Not on file   Occupational History    Occupation:      Employer: [employer]   Tobacco Use    Smoking status: Never     Passive exposure: Never    Smokeless tobacco: Never   Substance and Sexual Activity    Alcohol use: Yes      Alcohol/week: 10.0 standard drinks of alcohol     Types: 12 drink(s) per week     Comment: 2 beers daily    Drug use: No    Sexual activity: Yes     Partners: Female     Birth control/protection: Surgical     Comment: tubal ligation   Other Topics Concern     Service No    Blood Transfusions No    Caffeine Concern No    Occupational Exposure No    Hobby Hazards No    Sleep Concern No    Stress Concern No    Weight Concern No    Special Diet No    Back Care Not Asked    Exercise Yes    Bike Helmet Not Asked    Seat Belt Yes    Self-Exams Not Asked   Social History Narrative    Not on file     Social Determinants of Health     Financial Resource Strain: Not on file   Food Insecurity: Not on file   Transportation Needs: Not on file   Physical Activity: Not on file   Stress: Not on file   Social Connections: Not on file   Interpersonal Safety: Not on file   Housing Stability: Not on file     ROS  negative other than stated above    Exam:  GENERAL APPEARANCE: healthy, alert and no distress  EYES: Eyes grossly normal to inspection, PERRL, and fluorescein stain with uptake noted at the 10 o'clock position consistent with corneal abrasion.  There is no foreign body noted  RESP: lungs clear to auscultation - no rales, rhonchi or wheezes  CV: regular rates and rhythm, normal S1 S2, no S3 or S4 and no murmur, click or rub -  SKIN: no suspicious lesions or rashes    assessment/plan:  (S05.01XA) Abrasion of right cornea, initial encounter  (primary encounter diagnosis)  Comment:   Plan: erythromycin (ROMYCIN) 5 MG/GM ophthalmic         ointment          Patient with 1 day history of foreign body sensation in the right eye.  No foreign bodies noted but does have small corneal abrasion at the 10 o'clock position.  Erythromycin as directed.  Cool compresses for comfort measure along with over-the-counter med.  If symptoms fail to improve over the next 4 days return to clinic for repeat evaluation.

## 2024-09-18 ENCOUNTER — ALLIED HEALTH/NURSE VISIT (OUTPATIENT)
Dept: FAMILY MEDICINE | Facility: CLINIC | Age: 69
End: 2024-09-18

## 2024-09-18 DIAGNOSIS — Z23 NEED FOR VACCINATION: Primary | ICD-10-CM

## 2024-09-18 PROCEDURE — 90471 IMMUNIZATION ADMIN: CPT | Performed by: FAMILY MEDICINE

## 2024-09-18 PROCEDURE — 90662 IIV NO PRSV INCREASED AG IM: CPT | Performed by: FAMILY MEDICINE

## 2025-03-20 ENCOUNTER — OFFICE VISIT (OUTPATIENT)
Dept: FAMILY MEDICINE | Facility: CLINIC | Age: 70
End: 2025-03-20

## 2025-03-20 VITALS
SYSTOLIC BLOOD PRESSURE: 132 MMHG | BODY MASS INDEX: 26.53 KG/M2 | HEART RATE: 90 BPM | WEIGHT: 169 LBS | OXYGEN SATURATION: 99 % | TEMPERATURE: 97.9 F | RESPIRATION RATE: 20 BRPM | HEIGHT: 67 IN | DIASTOLIC BLOOD PRESSURE: 88 MMHG

## 2025-03-20 DIAGNOSIS — J01.00 ACUTE NON-RECURRENT MAXILLARY SINUSITIS: ICD-10-CM

## 2025-03-20 DIAGNOSIS — Z71.89 ACP (ADVANCE CARE PLANNING): Primary | ICD-10-CM

## 2025-03-20 NOTE — NURSING NOTE
Jose F Cortez is here for a cough for the past 3 weeks. Also has had some congestion since Tuesday.    Questioned patient about current smoking habits.  Pt. has never smoked.  PULSE regular  My Chart: active  CLASSIFICATION OF OVERWEIGHT AND OBESITY BY BMI                        Obesity Class           BMI(kg/m2)  Underweight                                    < 18.5  Normal                                         18.5-24.9  Overweight                                     25.0-29.9  OBESITY                     I                  30.0-34.9                             II                 35.0-39.9  EXTREME OBESITY             III                >40                            Patient's  BMI Body mass index is 26.47 kg/m .  http://hin.nhlbi.nih.gov/menuplanner/menu.cgi  Pre-visit planning  Immunizations - up to date  Colonoscopy - is up to date  Mammogram -   Asthma -   PHQ9 -    REFUGIO-7 -      The patient has verbalized that it is ok to leave a detailed voice message on the patient's cell phone with results/recommendations from this visit.

## 2025-03-20 NOTE — PROGRESS NOTES
"SUBJECTIVE:   Jose F Cortez is a 69 year old male who complains of nasal congestion, yellow and thick nasal discharge, facial pressure, left sinus pain, cough, and fatigue for 21 days. He denies a history of productive cough and denies a history of asthma. Patient does not smoke cigarettes.     OBJECTIVE:/88 (BP Location: Left arm, Patient Position: Chair, Cuff Size: Adult Regular)   Pulse 90   Temp 97.9  F (36.6  C) (Temporal)   Resp 20   Ht 1.702 m (5' 7\")   Wt 76.7 kg (169 lb)   SpO2 99%   BMI 26.47 kg/m     He appears well, vital signs are as noted by the nurse. Ears normal.  Throat and pharynx normal.  Neck supple. No adenopathy in the neck. Nose is congested. Sinuses  tender- left facial. The chest is clear, without wheezes or rales.    ASSESSMENT:   Sinusitis     PLAN:augmentin, I reviewed the risks, benefits, and possible side effects of the medication.  The patient had an opportunity to ask any questions regarding the treatment plan. The patient was encouraged to call my office if any problems.   Symptomatic therapy suggested: push fluids, rest, and use acetaminophen, ibuprofen, decongestant of choice as needed. Call or return to clinic prn if these symptoms worsen or fail to improve as anticipated.    "

## 2025-04-09 DIAGNOSIS — J30.1 ACUTE SEASONAL ALLERGIC RHINITIS DUE TO POLLEN: ICD-10-CM

## 2025-04-09 RX ORDER — OLOPATADINE HYDROCHLORIDE 1 MG/ML
SOLUTION/ DROPS OPHTHALMIC
Qty: 5 ML | Refills: 5 | Status: SHIPPED | OUTPATIENT
Start: 2025-04-09

## 2025-04-09 NOTE — TELEPHONE ENCOUNTER
Jose F Cortez is requesting a refill of:    Pending Prescriptions:                       Disp   Refills    olopatadine (PATANOL) 0.1 % ophthalmic so*5 mL   5            Sig: PLACE 1 DROP INTO BOTH EYES TWICE DAILY    Please close encounter if RX was sent. Thanks, Fadia

## 2025-06-09 ENCOUNTER — TRANSFERRED RECORDS (OUTPATIENT)
Dept: FAMILY MEDICINE | Facility: CLINIC | Age: 70
End: 2025-06-09

## 2025-08-11 DIAGNOSIS — J30.1 ACUTE SEASONAL ALLERGIC RHINITIS DUE TO POLLEN: ICD-10-CM

## 2025-08-12 RX ORDER — MONTELUKAST SODIUM 10 MG/1
1 TABLET ORAL DAILY
COMMUNITY
Start: 2025-08-12

## 2025-08-13 ENCOUNTER — MYC MEDICAL ADVICE (OUTPATIENT)
Dept: FAMILY MEDICINE | Facility: CLINIC | Age: 70
End: 2025-08-13

## 2025-08-13 DIAGNOSIS — J30.1 ACUTE SEASONAL ALLERGIC RHINITIS DUE TO POLLEN: ICD-10-CM

## 2025-08-14 RX ORDER — MONTELUKAST SODIUM 10 MG/1
10 TABLET ORAL DAILY
Qty: 30 TABLET | Refills: 0 | Status: SHIPPED | OUTPATIENT
Start: 2025-08-14

## 2025-08-16 ENCOUNTER — HEALTH MAINTENANCE LETTER (OUTPATIENT)
Age: 70
End: 2025-08-16